# Patient Record
Sex: MALE | Race: WHITE | HISPANIC OR LATINO | Employment: FULL TIME | URBAN - METROPOLITAN AREA
[De-identification: names, ages, dates, MRNs, and addresses within clinical notes are randomized per-mention and may not be internally consistent; named-entity substitution may affect disease eponyms.]

---

## 2021-11-11 PROBLEM — Z00.00 ANNUAL PHYSICAL EXAM: Status: ACTIVE | Noted: 2021-11-11

## 2021-11-11 RX ORDER — LOSARTAN POTASSIUM AND HYDROCHLOROTHIAZIDE 12.5; 5 MG/1; MG/1
1 TABLET ORAL DAILY
COMMUNITY
Start: 2021-10-29 | End: 2022-04-12 | Stop reason: SDUPTHER

## 2021-11-11 RX ORDER — AZELASTINE 1 MG/ML
2 SPRAY, METERED NASAL
COMMUNITY
Start: 2021-09-08

## 2021-11-12 ENCOUNTER — OFFICE VISIT (OUTPATIENT)
Dept: FAMILY MEDICINE CLINIC | Facility: CLINIC | Age: 61
End: 2021-11-12
Payer: COMMERCIAL

## 2021-11-12 VITALS
OXYGEN SATURATION: 97 % | HEART RATE: 76 BPM | RESPIRATION RATE: 17 BRPM | TEMPERATURE: 98.6 F | HEIGHT: 69 IN | SYSTOLIC BLOOD PRESSURE: 168 MMHG | DIASTOLIC BLOOD PRESSURE: 90 MMHG | WEIGHT: 190 LBS | BODY MASS INDEX: 28.14 KG/M2

## 2021-11-12 DIAGNOSIS — R09.81 NASAL CONGESTION: Primary | ICD-10-CM

## 2021-11-12 DIAGNOSIS — Z23 NEED FOR VACCINATION: ICD-10-CM

## 2021-11-12 DIAGNOSIS — Z11.59 NEED FOR HEPATITIS C SCREENING TEST: Primary | ICD-10-CM

## 2021-11-12 DIAGNOSIS — Z12.11 COLON CANCER SCREENING: ICD-10-CM

## 2021-11-12 DIAGNOSIS — I10 PRIMARY HYPERTENSION: ICD-10-CM

## 2021-11-12 DIAGNOSIS — Z00.00 ANNUAL PHYSICAL EXAM: ICD-10-CM

## 2021-11-12 DIAGNOSIS — Z11.4 SCREENING FOR HIV (HUMAN IMMUNODEFICIENCY VIRUS): ICD-10-CM

## 2021-11-12 PROCEDURE — 90471 IMMUNIZATION ADMIN: CPT | Performed by: FAMILY MEDICINE

## 2021-11-12 PROCEDURE — 99386 PREV VISIT NEW AGE 40-64: CPT | Performed by: FAMILY MEDICINE

## 2021-11-12 PROCEDURE — 3725F SCREEN DEPRESSION PERFORMED: CPT | Performed by: FAMILY MEDICINE

## 2021-11-12 PROCEDURE — 1036F TOBACCO NON-USER: CPT | Performed by: FAMILY MEDICINE

## 2021-11-12 PROCEDURE — 90682 RIV4 VACC RECOMBINANT DNA IM: CPT | Performed by: FAMILY MEDICINE

## 2021-11-12 PROCEDURE — 3008F BODY MASS INDEX DOCD: CPT | Performed by: FAMILY MEDICINE

## 2021-12-15 ENCOUNTER — OFFICE VISIT (OUTPATIENT)
Dept: FAMILY MEDICINE CLINIC | Facility: CLINIC | Age: 61
End: 2021-12-15
Payer: COMMERCIAL

## 2021-12-15 VITALS
HEART RATE: 75 BPM | SYSTOLIC BLOOD PRESSURE: 124 MMHG | BODY MASS INDEX: 27.25 KG/M2 | OXYGEN SATURATION: 98 % | DIASTOLIC BLOOD PRESSURE: 72 MMHG | RESPIRATION RATE: 16 BRPM | TEMPERATURE: 97.1 F | WEIGHT: 184 LBS | HEIGHT: 69 IN

## 2021-12-15 DIAGNOSIS — I10 PRIMARY HYPERTENSION: Primary | ICD-10-CM

## 2021-12-15 DIAGNOSIS — R73.09 ABNORMAL GLUCOSE: ICD-10-CM

## 2021-12-15 DIAGNOSIS — R73.03 PREDIABETES: ICD-10-CM

## 2021-12-15 DIAGNOSIS — R97.20 ELEVATED PSA: ICD-10-CM

## 2021-12-15 LAB — SL AMB POCT HEMOGLOBIN AIC: 5.8 (ref ?–6.5)

## 2021-12-15 PROCEDURE — 99214 OFFICE O/P EST MOD 30 MIN: CPT | Performed by: FAMILY MEDICINE

## 2021-12-15 PROCEDURE — 3725F SCREEN DEPRESSION PERFORMED: CPT | Performed by: FAMILY MEDICINE

## 2021-12-15 PROCEDURE — 1036F TOBACCO NON-USER: CPT | Performed by: FAMILY MEDICINE

## 2021-12-15 PROCEDURE — 3008F BODY MASS INDEX DOCD: CPT | Performed by: FAMILY MEDICINE

## 2021-12-15 PROCEDURE — 83036 HEMOGLOBIN GLYCOSYLATED A1C: CPT | Performed by: FAMILY MEDICINE

## 2022-01-31 ENCOUNTER — TELEPHONE (OUTPATIENT)
Dept: SURGERY | Facility: CLINIC | Age: 62
End: 2022-01-31

## 2022-01-31 NOTE — TELEPHONE ENCOUNTER
LM to reschedule his 3/10 appt with Dr Karen Pollard since Dr Karen Pollard is on vacation  Please assist in rescheduling the patients new appointment with Dr Karen Pollard   Thanks

## 2022-03-03 ENCOUNTER — OFFICE VISIT (OUTPATIENT)
Dept: UROLOGY | Facility: CLINIC | Age: 62
End: 2022-03-03
Payer: COMMERCIAL

## 2022-03-03 VITALS
WEIGHT: 184 LBS | HEART RATE: 75 BPM | BODY MASS INDEX: 27.25 KG/M2 | DIASTOLIC BLOOD PRESSURE: 70 MMHG | OXYGEN SATURATION: 99 % | HEIGHT: 69 IN | RESPIRATION RATE: 16 BRPM | SYSTOLIC BLOOD PRESSURE: 130 MMHG

## 2022-03-03 DIAGNOSIS — R97.20 ELEVATED PSA: ICD-10-CM

## 2022-03-03 LAB — POST-VOID RESIDUAL VOLUME, ML POC: 0 ML

## 2022-03-03 PROCEDURE — 51798 US URINE CAPACITY MEASURE: CPT | Performed by: PHYSICIAN ASSISTANT

## 2022-03-03 PROCEDURE — 1036F TOBACCO NON-USER: CPT | Performed by: PHYSICIAN ASSISTANT

## 2022-03-03 PROCEDURE — 99203 OFFICE O/P NEW LOW 30 MIN: CPT | Performed by: PHYSICIAN ASSISTANT

## 2022-03-03 PROCEDURE — 3008F BODY MASS INDEX DOCD: CPT | Performed by: PHYSICIAN ASSISTANT

## 2022-03-03 RX ORDER — LORAZEPAM 1 MG/1
1 TABLET ORAL ONCE
Qty: 1 TABLET | Refills: 0 | Status: SHIPPED | OUTPATIENT
Start: 2022-03-03 | End: 2022-03-03

## 2022-03-03 NOTE — PROGRESS NOTES
1  Elevated PSA  Ambulatory referral to Urology    POCT Measure PVR    PSA, total and free    Basic metabolic panel    MRI prostate multiparametric wo w contrast    LORazepam (ATIVAN) 1 mg tablet     Assessment and plan:       1  Elevated PSA  -status post negative transrectal ultrasound-guided prostate biopsy 12/18/2019  Post biopsy complication of urosepsis requiring hospitalization  -I reviewed his PSA value was well as his rectal examination  -will plan to perform multiparametric prostate MRI in repeat PS A  Based on these findings we will further evaluate if biopsy needed  Gabrielle Salas PA-C      Chief Complaint     Elevated PSA     History of Present Illness     Hemalatha Cui is a 64 y o  male presenting as a new patient for elevated PSA  Patient previously followed with urology in [place] for elevated PSA  Patient was found to have an elevated PSA  He underwent a transrectal ultrasound-guided prostate biopsy 12/18/2019 which revealed active chronic inflammation without any findings of malignancy  Patient did have complications of post biopsy sepsis requiring hospitalization and IV antibiotics  Patient is very hesitant to have an another biopsy as a result of this  PSA 7 27 (11/25/21)     Patient is overall comfortable with his lower urinary tract symptoms  Denies any dysuria, gross hematuria, or difficulty voiding  Medical comorbidities include HTN, prediabetes  PVR 0mL    Review of Systems     Review of Systems   Constitutional: Negative for activity change, appetite change, chills, diaphoresis, fatigue, fever and unexpected weight change  Respiratory: Negative for chest tightness and shortness of breath  Cardiovascular: Negative for chest pain, palpitations and leg swelling  Gastrointestinal: Negative for abdominal distention, abdominal pain, constipation, diarrhea, nausea and vomiting     Genitourinary: Negative for decreased urine volume, difficulty urinating, dysuria, enuresis, flank pain, frequency, genital sores, hematuria and urgency  Musculoskeletal: Negative for back pain, gait problem and myalgias  Skin: Negative for color change, pallor, rash and wound  Psychiatric/Behavioral: Negative for behavioral problems  The patient is not nervous/anxious  Allergies     No Known Allergies    Physical Exam     Physical Exam  Constitutional:       General: He is not in acute distress  Appearance: Normal appearance  He is normal weight  He is not ill-appearing, toxic-appearing or diaphoretic  HENT:      Head: Normocephalic and atraumatic  Eyes:      General:         Right eye: No discharge  Left eye: No discharge  Conjunctiva/sclera: Conjunctivae normal    Pulmonary:      Effort: Pulmonary effort is normal  No respiratory distress  Genitourinary:     Comments: Good rectal tone  Prostate 60g, mild firmness at right prostate apex without any overt nodularity  Musculoskeletal:         General: No swelling or tenderness  Normal range of motion  Skin:     General: Skin is warm and dry  Coloration: Skin is not jaundiced or pale  Neurological:      General: No focal deficit present  Mental Status: He is alert and oriented to person, place, and time  Psychiatric:         Mood and Affect: Mood normal          Behavior: Behavior normal          Thought Content:  Thought content normal            Vital Signs     Vitals:    03/03/22 0752   BP: 130/70   Pulse: 75   Resp: 16   SpO2: 99%   Weight: 83 5 kg (184 lb)   Height: 5' 9" (1 753 m)         Current Medications       Current Outpatient Medications:     losartan-hydrochlorothiazide (HYZAAR) 50-12 5 mg per tablet, Take 1 tablet by mouth daily, Disp: , Rfl:     azelastine (ASTELIN) 0 1 % nasal spray, 2 sprays into each nostril (Patient not taking: Reported on 11/12/2021 ), Disp: , Rfl:     fluticasone (FLONASE) 50 mcg/act nasal spray, 2 sprays into each nostril daily (Patient not taking: Reported on 3/3/2022 ), Disp: 16 g, Rfl: 6    LORazepam (ATIVAN) 1 mg tablet, Take 1 tablet (1 mg total) by mouth once for 1 dose Take 1 hour prior to biopsy, Disp: 1 tablet, Rfl: 0      Active Problems     Patient Active Problem List   Diagnosis    Annual physical exam    Primary hypertension    Elevated PSA    Abnormal glucose    Prediabetes         Past Medical History     Past Medical History:   Diagnosis Date    Hypertension     Nasal congestion          Surgical History     Past Surgical History:   Procedure Laterality Date    APPENDECTOMY           Family History     Family History   Problem Relation Age of Onset    Heart disease Mother     Heart disease Father     Hypertension Father     Diabetes Brother     No Known Problems Brother     No Known Problems Brother     No Known Problems Brother     No Known Problems Brother     No Known Problems Maternal Grandmother     No Known Problems Maternal Grandfather     No Known Problems Paternal Grandmother     No Known Problems Paternal Grandfather          Social History     Social History       Radiology

## 2022-04-07 ENCOUNTER — HOSPITAL ENCOUNTER (OUTPATIENT)
Dept: RADIOLOGY | Age: 62
Discharge: HOME | End: 2022-04-07
Payer: COMMERCIAL

## 2022-04-07 DIAGNOSIS — R97.20 ELEVATED PSA: ICD-10-CM

## 2022-04-07 PROCEDURE — 76377 3D RENDER W/INTRP POSTPROCES: CPT

## 2022-04-07 PROCEDURE — 72197 MRI PELVIS W/O & W/DYE: CPT

## 2022-04-07 PROCEDURE — A9585 GADOBUTROL INJECTION: HCPCS | Performed by: PHYSICIAN ASSISTANT

## 2022-04-07 PROCEDURE — G1004 CDSM NDSC: HCPCS

## 2022-04-07 RX ADMIN — GADOBUTROL 8 ML: 604.72 INJECTION INTRAVENOUS at 13:31

## 2022-04-12 DIAGNOSIS — I10 PRIMARY HYPERTENSION: Primary | ICD-10-CM

## 2022-04-12 RX ORDER — LOSARTAN POTASSIUM AND HYDROCHLOROTHIAZIDE 12.5; 5 MG/1; MG/1
1 TABLET ORAL DAILY
Qty: 30 TABLET | Refills: 0 | Status: SHIPPED | OUTPATIENT
Start: 2022-04-12 | End: 2022-05-05 | Stop reason: SDUPTHER

## 2022-04-27 ENCOUNTER — OFFICE VISIT (OUTPATIENT)
Dept: UROLOGY | Facility: CLINIC | Age: 62
End: 2022-04-27
Payer: COMMERCIAL

## 2022-04-27 VITALS
HEIGHT: 69 IN | BODY MASS INDEX: 27.02 KG/M2 | HEART RATE: 72 BPM | SYSTOLIC BLOOD PRESSURE: 142 MMHG | DIASTOLIC BLOOD PRESSURE: 88 MMHG | WEIGHT: 182.4 LBS

## 2022-04-27 DIAGNOSIS — R97.20 ELEVATED PSA: Primary | ICD-10-CM

## 2022-04-27 PROCEDURE — 99213 OFFICE O/P EST LOW 20 MIN: CPT | Performed by: PHYSICIAN ASSISTANT

## 2022-04-27 NOTE — PROGRESS NOTES
1  Elevated PSA          Assessment and plan:     1  Elevated PSA  -status post negative transrectal ultrasound-guided prostate biopsy 12/18/2019  Post biopsy complication of urosepsis requiring hospitalization  -mpMRI 4/7/22 PIRADS 4  -recommend transperineal fusion guided biopsy  Risks reviewed  Will take prebiopsy antibiotic as well as antibiotic prophylaxis during procedure  Riley Hebert PA-C      Chief Complaint     Elevated PSA     History of Present Illness     Dax Rowan is a 64 y o  male presenting for follow up of elevated PSA  Patient previously followed with urology in [place] for elevated PSA  Patient was found to have an elevated PSA  He underwent a transrectal ultrasound-guided prostate biopsy 12/18/2019 which revealed active chronic inflammation without any findings of malignancy  Patient did have complications of post biopsy sepsis requiring hospitalization and IV antibiotics  Patient is very hesitant to have an another biopsy as a result of this  PSA trend:  7 27 (11/25/21)   6 01 with 12% free PSA (4/5/22)    mpMRI (4/7/22) showing PIRADS 4 lesion of the right posterior peripheral zone  Prostate glad 62g  Hypointense lesion of the right iliac bone incompletely evaluated  Patient is overall comfortable with his lower urinary tract symptoms  Denies any dysuria, gross hematuria, or difficulty voiding  PVR at last visit revealed complete bladder emptying    Medical comorbidities include HTN, prediabetes  Review of Systems     Review of Systems   Constitutional: Negative for activity change, appetite change, chills, diaphoresis, fatigue, fever and unexpected weight change  Respiratory: Negative for chest tightness and shortness of breath  Cardiovascular: Negative for chest pain, palpitations and leg swelling  Gastrointestinal: Negative for abdominal distention, abdominal pain, constipation, diarrhea, nausea and vomiting     Genitourinary: Negative for decreased urine volume, difficulty urinating, dysuria, enuresis, flank pain, frequency, genital sores, hematuria and urgency  Musculoskeletal: Negative for back pain, gait problem and myalgias  Skin: Negative for color change, pallor, rash and wound  Psychiatric/Behavioral: Negative for behavioral problems  The patient is not nervous/anxious  Allergies     No Known Allergies    Physical Exam     Physical Exam  Constitutional:       General: He is not in acute distress  Appearance: Normal appearance  He is normal weight  He is not ill-appearing, toxic-appearing or diaphoretic  HENT:      Head: Normocephalic and atraumatic  Eyes:      General:         Right eye: No discharge  Left eye: No discharge  Conjunctiva/sclera: Conjunctivae normal    Pulmonary:      Effort: Pulmonary effort is normal  No respiratory distress  Genitourinary:     Comments: Good rectal tone  Prostate 60g, mild firmness at right prostate apex without any overt nodularity  Musculoskeletal:         General: No swelling or tenderness  Normal range of motion  Skin:     General: Skin is warm and dry  Coloration: Skin is not jaundiced or pale  Neurological:      General: No focal deficit present  Mental Status: He is alert and oriented to person, place, and time  Psychiatric:         Mood and Affect: Mood normal          Behavior: Behavior normal          Thought Content:  Thought content normal            Vital Signs     Vitals:    04/27/22 0909   BP: 142/88   BP Location: Left arm   Patient Position: Sitting   Cuff Size: Adult   Pulse: 72   Weight: 82 7 kg (182 lb 6 4 oz)   Height: 5' 9" (1 753 m)         Current Medications       Current Outpatient Medications:     losartan-hydrochlorothiazide (HYZAAR) 50-12 5 mg per tablet, Take 1 tablet by mouth daily, Disp: 30 tablet, Rfl: 0    azelastine (ASTELIN) 0 1 % nasal spray, 2 sprays into each nostril (Patient not taking: Reported on 11/12/2021 ), Disp: , Rfl:     fluticasone (FLONASE) 50 mcg/act nasal spray, 2 sprays into each nostril daily (Patient not taking: Reported on 3/3/2022 ), Disp: 16 g, Rfl: 6    LORazepam (ATIVAN) 1 mg tablet, Take 1 tablet (1 mg total) by mouth once for 1 dose Take 1 hour prior to biopsy (Patient not taking: Reported on 4/27/2022 ), Disp: 1 tablet, Rfl: 0      Active Problems     Patient Active Problem List   Diagnosis    Encounter for screening colonoscopy    Primary hypertension    Elevated PSA    Abnormal glucose    Prediabetes         Past Medical History     Past Medical History:   Diagnosis Date    Hypertension     Nasal congestion          Surgical History     Past Surgical History:   Procedure Laterality Date    APPENDECTOMY           Family History     Family History   Problem Relation Age of Onset    Heart disease Mother     Heart disease Father     Hypertension Father     Diabetes Brother     No Known Problems Brother     No Known Problems Brother     No Known Problems Brother     No Known Problems Brother     No Known Problems Maternal Grandmother     No Known Problems Maternal Grandfather     No Known Problems Paternal Grandmother     No Known Problems Paternal Grandfather          Social History     Social History       Radiology

## 2022-04-28 ENCOUNTER — CONSULT (OUTPATIENT)
Dept: SURGERY | Facility: CLINIC | Age: 62
End: 2022-04-28
Payer: COMMERCIAL

## 2022-04-28 VITALS
DIASTOLIC BLOOD PRESSURE: 88 MMHG | OXYGEN SATURATION: 98 % | HEART RATE: 75 BPM | RESPIRATION RATE: 18 BRPM | WEIGHT: 183 LBS | HEIGHT: 69 IN | BODY MASS INDEX: 27.11 KG/M2 | SYSTOLIC BLOOD PRESSURE: 130 MMHG | TEMPERATURE: 97.2 F

## 2022-04-28 DIAGNOSIS — Z12.11 ENCOUNTER FOR SCREENING COLONOSCOPY: Primary | ICD-10-CM

## 2022-04-28 PROCEDURE — 1036F TOBACCO NON-USER: CPT | Performed by: SURGERY

## 2022-04-28 PROCEDURE — 99204 OFFICE O/P NEW MOD 45 MIN: CPT | Performed by: SURGERY

## 2022-04-28 PROCEDURE — 3008F BODY MASS INDEX DOCD: CPT | Performed by: SURGERY

## 2022-04-28 NOTE — PROGRESS NOTES
Assessment/Plan:  60-year-old male with a need for screening colonoscopy  This is being scheduled    No problem-specific Assessment & Plan notes found for this encounter  Diagnoses and all orders for this visit:    Encounter for screening colonoscopy          Subjective:      Patient ID: Cedric Alston is a 64 y o  male  60-year-old male who had a colonoscopy about 10 years ago which was negative  He is sent in for screening colonoscopy now  He has no history of blood in the stool or change in his stool habits  No history of colon carcinoma in the family      The following portions of the patient's history were reviewed and updated as appropriate: allergies, current medications, past family history, past medical history, past social history, past surgical history and problem list     Review of Systems   Constitutional:        Covid shots x 3  ? Had covid   HENT: Negative  Eyes:        Cataracts starting   Respiratory:        Sinus  Never smoked   Cardiovascular:        Hypertension   Gastrointestinal:        Appy out  Colonoscopy 10 years ago, fine  No family hx colon ca   Endocrine:        Borderline DM II   Genitourinary:        Nocturia x 1  psa high   Musculoskeletal: Positive for arthralgias  Neurological: Negative  Hematological: Negative  Psychiatric/Behavioral:        Anxiety         Objective:      /88 (BP Location: Left arm, Patient Position: Sitting, Cuff Size: Adult)   Pulse 75   Temp (!) 97 2 °F (36 2 °C)   Resp 18   Ht 5' 9" (1 753 m)   Wt 83 kg (183 lb)   SpO2 98%   BMI 27 02 kg/m²          Physical Exam  Vitals reviewed  Constitutional:       Appearance: Normal appearance  He is normal weight  HENT:      Head: Normocephalic and atraumatic  Eyes:      General: No scleral icterus  Conjunctiva/sclera: Conjunctivae normal    Cardiovascular:      Rate and Rhythm: Normal rate and regular rhythm  Heart sounds: Normal heart sounds   No murmur heard       Pulmonary:      Effort: Pulmonary effort is normal  No respiratory distress  Breath sounds: Normal breath sounds  No stridor  No wheezing, rhonchi or rales  Abdominal:      Palpations: Abdomen is soft  There is no mass  Hernia: No hernia is present  Musculoskeletal:         General: Normal range of motion  Cervical back: Normal range of motion  Lymphadenopathy:      Cervical: No cervical adenopathy  Skin:     General: Skin is warm and dry  Coloration: Skin is not jaundiced  Neurological:      Mental Status: He is alert and oriented to person, place, and time  Psychiatric:         Mood and Affect: Mood normal          Behavior: Behavior normal          Thought Content:  Thought content normal          Judgment: Judgment normal

## 2022-05-05 ENCOUNTER — OFFICE VISIT (OUTPATIENT)
Dept: FAMILY MEDICINE CLINIC | Facility: CLINIC | Age: 62
End: 2022-05-05
Payer: COMMERCIAL

## 2022-05-05 VITALS
RESPIRATION RATE: 16 BRPM | HEART RATE: 80 BPM | TEMPERATURE: 97.9 F | DIASTOLIC BLOOD PRESSURE: 82 MMHG | WEIGHT: 182 LBS | SYSTOLIC BLOOD PRESSURE: 130 MMHG | OXYGEN SATURATION: 98 % | BODY MASS INDEX: 26.96 KG/M2 | HEIGHT: 69 IN

## 2022-05-05 DIAGNOSIS — I10 PRIMARY HYPERTENSION: ICD-10-CM

## 2022-05-05 DIAGNOSIS — R73.03 PREDIABETES: ICD-10-CM

## 2022-05-05 DIAGNOSIS — R97.20 ELEVATED PSA: Primary | ICD-10-CM

## 2022-05-05 PROCEDURE — 99213 OFFICE O/P EST LOW 20 MIN: CPT | Performed by: FAMILY MEDICINE

## 2022-05-05 RX ORDER — LOSARTAN POTASSIUM AND HYDROCHLOROTHIAZIDE 12.5; 5 MG/1; MG/1
1 TABLET ORAL DAILY
Qty: 90 TABLET | Refills: 3 | Status: SHIPPED | OUTPATIENT
Start: 2022-05-05

## 2022-05-05 NOTE — PROGRESS NOTES
Assessment/Plan:         Problem List Items Addressed This Visit        Cardiovascular and Mediastinum    Primary hypertension     Well controlled on current therapy continue with current medications and will reassess next visit           Relevant Medications    losartan-hydrochlorothiazide (HYZAAR) 50-12 5 mg per tablet       Other    Elevated PSA - Primary     Will have biopsy [facility]         Prediabetes     Pt is avoiding  Sweets and starches                 Subjective: pt here for interval visit HTN  Prediabetes feels well     Patient ID: Carine Staples is a 64 y o  male  HPI    The following portions of the patient's history were reviewed and updated as appropriate:   Past Medical History:  He has a past medical history of Hypertension and Nasal congestion  ,  _______________________________________________________________________  Medical Problems:  does not have any pertinent problems on file ,  _______________________________________________________________________  Past Surgical History:   has a past surgical history that includes Appendectomy  ,  _______________________________________________________________________  Family History:  family history includes Diabetes in his brother; Heart disease in his father and mother; Hypertension in his father; No Known Problems in his brother, brother, brother, brother, maternal grandfather, maternal grandmother, paternal grandfather, and paternal grandmother ,  _______________________________________________________________________  Social History:   reports that he has never smoked  He has never used smokeless tobacco  He reports current alcohol use of about 2 0 standard drinks of alcohol per week  He reports that he does not use drugs  ,  _______________________________________________________________________  Allergies:  has No Known Allergies     _______________________________________________________________________  Current Outpatient Medications   Medication Sig Dispense Refill    losartan-hydrochlorothiazide (HYZAAR) 50-12 5 mg per tablet Take 1 tablet by mouth daily 90 tablet 3    azelastine (ASTELIN) 0 1 % nasal spray 2 sprays into each nostril (Patient not taking: Reported on 11/12/2021 )      fluticasone (FLONASE) 50 mcg/act nasal spray 2 sprays into each nostril daily (Patient not taking: Reported on 3/3/2022 ) 16 g 6    LORazepam (ATIVAN) 1 mg tablet Take 1 tablet (1 mg total) by mouth once for 1 dose Take 1 hour prior to biopsy (Patient not taking: Reported on 4/27/2022 ) 1 tablet 0     No current facility-administered medications for this visit      _______________________________________________________________________  Review of Systems   Respiratory: Negative for cough, chest tightness and shortness of breath  Cardiovascular: Negative for chest pain, palpitations and leg swelling  Neurological: Negative for dizziness, weakness, light-headedness and headaches  Psychiatric/Behavioral: Negative for dysphoric mood  The patient is not nervous/anxious  Objective:  Vitals:    05/05/22 1452   BP: 130/82   BP Location: Left arm   Patient Position: Sitting   Cuff Size: Standard   Pulse: 80   Resp: 16   Temp: 97 9 °F (36 6 °C)   TempSrc: Temporal   SpO2: 98%   Weight: 82 6 kg (182 lb)   Height: 5' 9" (1 753 m)     Body mass index is 26 88 kg/m²  Physical Exam  Constitutional:       General: He is not in acute distress  Appearance: Normal appearance  He is well-developed  He is not ill-appearing  Eyes:      Extraocular Movements: Extraocular movements intact  Pupils: Pupils are equal, round, and reactive to light  Cardiovascular:      Rate and Rhythm: Normal rate and regular rhythm  Pulses: Normal pulses  Heart sounds: Normal heart sounds  No murmur heard  Pulmonary:      Effort: Pulmonary effort is normal       Breath sounds: Normal breath sounds  Musculoskeletal:      Cervical back: Normal range of motion  Neurological:      General: No focal deficit present  Mental Status: He is alert and oriented to person, place, and time  Mental status is at baseline     Psychiatric:         Mood and Affect: Mood normal

## 2022-05-09 ENCOUNTER — APPOINTMENT (EMERGENCY)
Dept: CT IMAGING | Facility: HOSPITAL | Age: 62
End: 2022-05-09
Payer: COMMERCIAL

## 2022-05-09 ENCOUNTER — HOSPITAL ENCOUNTER (EMERGENCY)
Facility: HOSPITAL | Age: 62
Discharge: HOME | End: 2022-05-09
Attending: EMERGENCY MEDICINE
Payer: COMMERCIAL

## 2022-05-09 VITALS
SYSTOLIC BLOOD PRESSURE: 142 MMHG | HEART RATE: 86 BPM | OXYGEN SATURATION: 99 % | TEMPERATURE: 102.8 F | DIASTOLIC BLOOD PRESSURE: 74 MMHG | RESPIRATION RATE: 18 BRPM

## 2022-05-09 DIAGNOSIS — R50.9 ACUTE FEBRILE ILLNESS: Primary | ICD-10-CM

## 2022-05-09 LAB
BACTERIA UR QL AUTO: ABNORMAL /HPF
BILIRUB UR QL STRIP: NEGATIVE
CLARITY UR: CLEAR
COLOR UR: YELLOW
FLUAV RNA RESP QL NAA+PROBE: NEGATIVE
FLUBV RNA RESP QL NAA+PROBE: NEGATIVE
GLUCOSE UR STRIP-MCNC: NEGATIVE MG/DL
HGB UR QL STRIP.AUTO: ABNORMAL
KETONES UR STRIP-MCNC: NEGATIVE MG/DL
LEUKOCYTE ESTERASE UR QL STRIP: NEGATIVE
MUCOUS THREADS UR QL AUTO: ABNORMAL
NITRITE UR QL STRIP: NEGATIVE
NON-SQ EPI CELLS URNS QL MICRO: ABNORMAL /HPF
OTHER STN SPEC: ABNORMAL
PH UR STRIP.AUTO: 6 [PH]
PROT UR STRIP-MCNC: ABNORMAL MG/DL
RBC #/AREA URNS AUTO: ABNORMAL /HPF
RSV RNA RESP QL NAA+PROBE: NEGATIVE
SARS-COV-2 RNA RESP QL NAA+PROBE: NEGATIVE
SP GR UR STRIP.AUTO: >=1.03 (ref 1–1.03)
UROBILINOGEN UR QL STRIP.AUTO: 0.2 E.U./DL
WBC #/AREA URNS AUTO: ABNORMAL /HPF

## 2022-05-09 PROCEDURE — G1004 CDSM NDSC: HCPCS

## 2022-05-09 PROCEDURE — 99284 EMERGENCY DEPT VISIT MOD MDM: CPT

## 2022-05-09 PROCEDURE — 81001 URINALYSIS AUTO W/SCOPE: CPT | Performed by: EMERGENCY MEDICINE

## 2022-05-09 PROCEDURE — 74176 CT ABD & PELVIS W/O CONTRAST: CPT

## 2022-05-09 PROCEDURE — 0241U HB NFCT DS VIR RESP RNA 4 TRGT: CPT | Performed by: EMERGENCY MEDICINE

## 2022-05-09 PROCEDURE — 99282 EMERGENCY DEPT VISIT SF MDM: CPT | Performed by: EMERGENCY MEDICINE

## 2022-05-09 RX ORDER — IBUPROFEN 400 MG/1
400 TABLET ORAL ONCE
Status: COMPLETED | OUTPATIENT
Start: 2022-05-09 | End: 2022-05-09

## 2022-05-09 RX ADMIN — IBUPROFEN 400 MG: 400 TABLET, FILM COATED ORAL at 13:23

## 2022-05-09 NOTE — Clinical Note
Deborah Shah was seen and treated in our emergency department on 5/9/2022  No restrictions            Diagnosis:     Joselyn  may return to work on return date  He may return on this date: 05/11/2022         If you have any questions or concerns, please don't hesitate to call        Hernandez Moraes MD    ______________________________           _______________          _______________  Hospital Representative                              Date                                Time

## 2022-05-09 NOTE — ED PROVIDER NOTES
History  Chief Complaint   Patient presents with    Fever - 9 weeks to 74 years     Pt reports fever, body aches, chills and burning with urination  Home covid test negative       History provided by:  Patient   used: No    Fever - 9 weeks to 74 years  Associated symptoms: chills, dysuria and myalgias    Associated symptoms: no chest pain, no congestion, no cough, no ear pain, no headaches, no nausea, no rash and no vomiting     75-year-old male with history of hypertension presents for evaluation of fever for 2 days with some burning with urination  Denies any abdominal pain, back or flank pain, hematuria, history of UTI  The first day he also noticed some body aches, chills, a little bit of a sore throat, and some left leg pain  He feels fatigued overall  Body aches, sore throat, leg pain have all improved  Denies any rashes, nausea, vomiting  He has been eating, drinking, sleeping normal   No sick contacts  He did take a home COVID test which was negative  No cough, dyspnea, chest pain  Febrile on arrival   Given Motrin in triage  Vitals otherwise normal heart sounds normal   Breath sounds clear  Abdomen soft and nontender  No CVA tenderness  Will check UA, re-evaluate  Prior to Admission Medications   Prescriptions Last Dose Informant Patient Reported? Taking?    LORazepam (ATIVAN) 1 mg tablet   No No   Sig: Take 1 tablet (1 mg total) by mouth once for 1 dose Take 1 hour prior to biopsy   Patient not taking: No sig reported   azelastine (ASTELIN) 0 1 % nasal spray   Yes No   Si sprays into each nostril   Patient not taking: No sig reported   fluticasone (FLONASE) 50 mcg/act nasal spray   No No   Si sprays into each nostril daily   Patient not taking: No sig reported   losartan-hydrochlorothiazide (HYZAAR) 50-12 5 mg per tablet   No No   Sig: Take 1 tablet by mouth daily      Facility-Administered Medications: None       Past Medical History:   Diagnosis Date    Hypertension     Nasal congestion        Past Surgical History:   Procedure Laterality Date    APPENDECTOMY         Family History   Problem Relation Age of Onset    Heart disease Mother     Heart disease Father     Hypertension Father     Diabetes Brother     No Known Problems Brother     No Known Problems Brother     No Known Problems Brother     No Known Problems Brother     No Known Problems Maternal Grandmother     No Known Problems Maternal Grandfather     No Known Problems Paternal Grandmother     No Known Problems Paternal Grandfather      I have reviewed and agree with the history as documented  E-Cigarette/Vaping    E-Cigarette Use Never User      E-Cigarette/Vaping Substances    Nicotine No     THC No     CBD No     Flavoring No     Other No     Unknown No      Social History     Tobacco Use    Smoking status: Never Smoker    Smokeless tobacco: Never Used   Vaping Use    Vaping Use: Never used   Substance Use Topics    Alcohol use: Yes     Alcohol/week: 2 0 standard drinks     Types: 2 Glasses of wine per week     Comment: occ    Drug use: Never       Review of Systems   Constitutional: Positive for chills, fatigue and fever  Negative for activity change and appetite change  HENT: Negative for congestion, ear pain, facial swelling, sinus pressure, trouble swallowing and voice change  Respiratory: Negative for cough, chest tightness and shortness of breath  Cardiovascular: Negative for chest pain  Gastrointestinal: Negative for abdominal pain, nausea and vomiting  Genitourinary: Positive for dysuria  Negative for difficulty urinating  Musculoskeletal: Positive for myalgias  Negative for back pain  Skin: Negative for color change and rash  Neurological: Negative for dizziness, weakness, light-headedness and headaches  All other systems reviewed and are negative  Physical Exam  Physical Exam  Vitals and nursing note reviewed     Constitutional: Appearance: Normal appearance  HENT:      Head: Normocephalic and atraumatic  Cardiovascular:      Rate and Rhythm: Normal rate and regular rhythm  Heart sounds: Normal heart sounds  No murmur heard  Pulmonary:      Effort: Pulmonary effort is normal       Breath sounds: Normal breath sounds  Abdominal:      General: There is no distension  Palpations: Abdomen is soft  Tenderness: There is no abdominal tenderness  There is no right CVA tenderness or left CVA tenderness  Musculoskeletal:         General: No swelling or tenderness  Normal range of motion  Cervical back: Normal range of motion and neck supple  Lymphadenopathy:      Cervical: No cervical adenopathy  Skin:     General: Skin is warm and dry  Capillary Refill: Capillary refill takes less than 2 seconds  Findings: No rash  Neurological:      General: No focal deficit present  Mental Status: He is alert and oriented to person, place, and time     Psychiatric:         Mood and Affect: Mood normal          Behavior: Behavior normal          Vital Signs  ED Triage Vitals [05/09/22 1318]   Temperature Pulse Respirations Blood Pressure SpO2   (!) 102 8 °F (39 3 °C) 86 18 142/74 99 %      Temp Source Heart Rate Source Patient Position - Orthostatic VS BP Location FiO2 (%)   Oral Monitor Sitting Right arm --      Pain Score       No Pain           Vitals:    05/09/22 1318   BP: 142/74   Pulse: 86   Patient Position - Orthostatic VS: Sitting         Visual Acuity      ED Medications  Medications   ibuprofen (MOTRIN) tablet 400 mg (400 mg Oral Given 5/9/22 1323)       Diagnostic Studies  Results Reviewed     Procedure Component Value Units Date/Time    COVID/FLU/RSV - 2 hour TAT [[id number]]  (Normal) Collected: 05/09/22 1703    Lab Status: Final result Specimen: Nares from Nose Updated: 05/09/22 9884     SARS-CoV-2 Negative     INFLUENZA A PCR Negative     INFLUENZA B PCR Negative     RSV PCR Negative    Narrative: FOR PEDIATRIC PATIENTS - copy/paste COVID Guidelines URL to browser: [email or web address]    SARS-CoV-2 assay is a Nucleic Acid Amplification assay intended for the  qualitative detection of nucleic acid from SARS-CoV-2 in nasopharyngeal  swabs  Results are for the presumptive identification of SARS-CoV-2 RNA  Positive results are indicative of infection with SARS-CoV-2, the virus  causing COVID-19, but do not rule out bacterial infection or co-infection  with other viruses  Laboratories within [facility] and [facility] are required to report all positive results to the appropriate  public health authorities  Negative results do not preclude SARS-CoV-2  infection and should not be used as the sole basis for treatment or other  patient management decisions  Negative results must be combined with  clinical observations, patient history, and epidemiological information  This test has not been FDA cleared or approved  This test has been authorized by FDA under an Emergency Use Authorization  (EUA)  This test is only authorized for the duration of time the  declaration that circumstances exist justifying the authorization of the  emergency use of an in vitro diagnostic tests for detection of SARS-CoV-2  virus and/or diagnosis of COVID-19 infection under section 564(b)(1) of  the Act, 21 U  S C  080HKC-4(N)(4), unless the authorization is terminated  or revoked sooner  The test has been validated but independent review by FDA  and CLIA is pending  Test performed using [facility] GeneXpert: This RT-PCR assay targets N2,  a region unique to SARS-CoV-2  A conserved region in the E-gene was chosen  for pan-Sarbecovirus detection which includes SARS-CoV-2      Urine Microscopic [[id number]]  (Abnormal) Collected: 05/09/22 1455    Lab Status: Final result Specimen: Urine, Clean Catch Updated: 05/09/22 1516     RBC, UA 1-2 /hpf      WBC, UA 0-1 /hpf Epithelial Cells Occasional /hpf      Bacteria, UA Occasional /hpf      OTHER OBSERVATIONS Sperm Present     MUCUS THREADS Occasional    UA w Reflex to Microscopic w Reflex to Culture [[id number]]  (Abnormal) Collected: 05/09/22 1455    Lab Status: Final result Specimen: Urine, Clean Catch Updated: 05/09/22 1508     Color, UA Yellow     Clarity, UA Clear     Specific Gravity, UA >=1 030     pH, UA 6 0     Leukocytes, UA Negative     Nitrite, UA Negative     Protein, UA Trace mg/dl      Glucose, UA Negative mg/dl      Ketones, UA Negative mg/dl      Urobilinogen, UA 0 2 E U /dl      Bilirubin, UA Negative     Blood, UA Small                 CT renal stone study abdomen pelvis without contrast   Final Result by Hilda Park MD (05/09 1622)         1  Mild nonspecific bilateral perinephric edema  No calculi in the kidneys ureters or bladder  No evidence of abscess  2   Mild prostatomegaly  3   Large amount of stool throughout the colon  No evidence of bowel obstruction  The study was marked in [facility] for immediate notification  Workstation performed: [id number]                    Procedures  Procedures         ED Course  ED Course as of 05/29/22 1553   Mon May 09, 2022   1653 Discussed CT and urine results  No evidence of UTI, ureteral stone, prostatitis  Patient feels okay at this time  Symptoms likely related to viral illness  Ready for discharge home  Pending COVID/flu/RSV swab  Will give note for 2 days  Discussed return to the ED if he develops new or concerning symptoms  Advised increasing fluid intake and managing fever with antipyretics  MDM  Number of Diagnoses or Management Options  Acute febrile illness: new and requires workup  Diagnosis management comments: 80-year-old male presented for evaluation of 2 days of fever, body aches, some discomfort urinating  Also had a sore throat, fatigue    Febrile on arrival   No focal source of infection identified  COVID/flu/RSV pending  Likely viral illness  UA and other labs unremarkable  Stable for discharge home  Continue symptomatic management  Return if worse  Amount and/or Complexity of Data Reviewed  Clinical lab tests: ordered and reviewed  Tests in the radiology section of CPT®: ordered and reviewed    Patient Progress  Patient progress: stable      Disposition  Final diagnoses:   Acute febrile illness     Time reflects when diagnosis was documented in both MDM as applicable and the Disposition within this note     Time User Action Codes Description Comment    5/9/2022  4:54 PM Shayla Morrison Add [R50 9] Acute febrile illness       ED Disposition     ED Disposition   Discharge    Condition   Stable    Date/Time   Mon May 9, 2022  4:54 PM    Comment   Itz Ramirez 61 discharge to home/self care                 Follow-up Information     Follow up With Specialties Details Why Contact Info Additional [address] [address] [place], MD Family Medicine   [address] [facility] [address] [facility] Emergency Department Emergency Medicine  If symptoms worsen [address] [facility] [address] [facility] Emergency Department, [address], [place], [address] [facility], [address]          Discharge Medication List as of 5/9/2022  4:58 PM      CONTINUE these medications which have NOT CHANGED    Details   azelastine (ASTELIN) 0 1 % nasal spray 2 sprays into each nostril, Starting Wed 9/8/2021, Historical Med      fluticasone (FLONASE) 50 mcg/act nasal spray 2 sprays into each nostril daily, Starting Thu 12/2/2021, Normal      LORazepam (ATIVAN) 1 mg tablet Take 1 tablet (1 mg total) by mouth once for 1 dose Take 1 hour prior to biopsy, Starting Thu 3/3/2022, Normal      losartan-hydrochlorothiazide (HYZAAR) 50-12 5 mg per tablet Take 1 tablet by mouth daily, Starting Thu 5/5/2022, Normal             No discharge procedures on file      PDMP Review     None          ED Provider  Electronically Signed by           Nilson Lr MD  05/29/22 7795

## 2022-05-12 ENCOUNTER — OFFICE VISIT (OUTPATIENT)
Dept: FAMILY MEDICINE CLINIC | Facility: CLINIC | Age: 62
End: 2022-05-12
Payer: COMMERCIAL

## 2022-05-12 VITALS
WEIGHT: 183 LBS | HEIGHT: 69 IN | HEART RATE: 81 BPM | OXYGEN SATURATION: 97 % | BODY MASS INDEX: 27.11 KG/M2 | RESPIRATION RATE: 16 BRPM | TEMPERATURE: 97.2 F | SYSTOLIC BLOOD PRESSURE: 100 MMHG | DIASTOLIC BLOOD PRESSURE: 60 MMHG

## 2022-05-12 DIAGNOSIS — R50.9 FEBRILE ILLNESS: ICD-10-CM

## 2022-05-12 DIAGNOSIS — I10 PRIMARY HYPERTENSION: ICD-10-CM

## 2022-05-12 DIAGNOSIS — R79.89 ABNORMAL CBC: ICD-10-CM

## 2022-05-12 DIAGNOSIS — R39.9 ABNORMAL RENAL FINDING: Primary | ICD-10-CM

## 2022-05-12 PROCEDURE — 3725F SCREEN DEPRESSION PERFORMED: CPT | Performed by: FAMILY MEDICINE

## 2022-05-12 PROCEDURE — 87636 SARSCOV2 & INF A&B AMP PRB: CPT | Performed by: FAMILY MEDICINE

## 2022-05-12 PROCEDURE — 99214 OFFICE O/P EST MOD 30 MIN: CPT | Performed by: FAMILY MEDICINE

## 2022-05-12 NOTE — ASSESSMENT & PLAN NOTE
Likely viral pt had covid flu test 2 days after onset of illness may have been too low will recheck   If worsens to Er followup here 4 days (Monday)

## 2022-05-12 NOTE — PROGRESS NOTES
Assessment/Plan:         Problem List Items Addressed This Visit        Cardiovascular and Mediastinum    Primary hypertension     Pt on meds pt to hold until feels better              Other    Abnormal renal finding - Primary     Bmp today increase fluids           Relevant Orders    Basic metabolic panel    Abnormal CBC     Recheck             Relevant Orders    CBC and differential    Febrile illness     Likely viral pt had covid flu test 2 days after onset of illness may have been too low will recheck  If worsens to Er followup here 4 days (Monday)           Relevant Orders    Covid/Flu- Office Collect            Subjective: pt ill  For 5 days had fever 102 4 then went to 103 the next day went to ER 2  Chest xray  Done neg had elevated wbc  Pt still taking bp meds  No dysuria now no perineal pain      Patient ID: Jada Mcintosh is a 64 y o  male  HPI    The following portions of the patient's history were reviewed and updated as appropriate:   Past Medical History:  He has a past medical history of Hypertension and Nasal congestion  ,  _______________________________________________________________________  Medical Problems:  does not have any pertinent problems on file ,  _______________________________________________________________________  Past Surgical History:   has a past surgical history that includes Appendectomy  ,  _______________________________________________________________________  Family History:  family history includes Diabetes in his brother; Heart disease in his father and mother; Hypertension in his father; No Known Problems in his brother, brother, brother, brother, maternal grandfather, maternal grandmother, paternal grandfather, and paternal grandmother ,  _______________________________________________________________________  Social History:   reports that he has never smoked   He has never used smokeless tobacco  He reports current alcohol use of about 2 0 standard drinks of alcohol per week  He reports that he does not use drugs  ,  _______________________________________________________________________  Allergies:  has No Known Allergies     _______________________________________________________________________  Current Outpatient Medications   Medication Sig Dispense Refill    losartan-hydrochlorothiazide (HYZAAR) 50-12 5 mg per tablet Take 1 tablet by mouth daily 90 tablet 3    azelastine (ASTELIN) 0 1 % nasal spray 2 sprays into each nostril (Patient not taking: No sig reported)      fluticasone (FLONASE) 50 mcg/act nasal spray 2 sprays into each nostril daily (Patient not taking: No sig reported) 16 g 6    LORazepam (ATIVAN) 1 mg tablet Take 1 tablet (1 mg total) by mouth once for 1 dose Take 1 hour prior to biopsy (Patient not taking: No sig reported) 1 tablet 0     No current facility-administered medications for this visit      _______________________________________________________________________  Review of Systems   Constitutional: Positive for fever  Negative for appetite change, chills and fatigue  Respiratory: Negative for cough, chest tightness and shortness of breath  Cardiovascular: Negative for chest pain, palpitations and leg swelling  Gastrointestinal: Negative for abdominal pain, constipation, diarrhea, nausea and vomiting  Genitourinary: Negative for difficulty urinating and frequency  Musculoskeletal: Negative for arthralgias, back pain and neck pain  Skin: Negative for rash  Neurological: Negative for dizziness, weakness, light-headedness, numbness and headaches  Hematological: Does not bruise/bleed easily  Psychiatric/Behavioral: Negative for dysphoric mood and sleep disturbance  The patient is not nervous/anxious            Objective:  Vitals:    05/12/22 1322   BP: 100/60   BP Location: Left arm   Patient Position: Sitting   Cuff Size: Standard   Pulse: 81   Resp: 16   Temp: (!) 97 2 °F (36 2 °C)   TempSrc: Temporal   SpO2: 97%   Weight: 83 kg (183 lb)   Height: 5' 9" (1 753 m)     Body mass index is 27 02 kg/m²  Physical Exam  Vitals reviewed  Constitutional:       General: He is not in acute distress  Appearance: Normal appearance  He is well-developed  He is not ill-appearing  HENT:      Mouth/Throat:      Mouth: Mucous membranes are moist    Eyes:      Extraocular Movements: Extraocular movements intact  Conjunctiva/sclera: Conjunctivae normal       Pupils: Pupils are equal, round, and reactive to light  Neck:      Thyroid: No thyromegaly  Vascular: No carotid bruit  Cardiovascular:      Rate and Rhythm: Normal rate and regular rhythm  Pulses: Normal pulses  Heart sounds: Normal heart sounds  No murmur heard  Pulmonary:      Effort: Pulmonary effort is normal       Breath sounds: Normal breath sounds  Chest:      Chest wall: No tenderness  Abdominal:      General: Bowel sounds are normal  There is no distension  Palpations: Abdomen is soft  Tenderness: There is no abdominal tenderness  Musculoskeletal:      Cervical back: Normal range of motion and neck supple  Lymphadenopathy:      Cervical: No cervical adenopathy  Skin:     General: Skin is warm and dry  Neurological:      General: No focal deficit present  Mental Status: He is alert and oriented to person, place, and time  Mental status is at baseline  Cranial Nerves: No cranial nerve deficit     Psychiatric:         Mood and Affect: Mood normal          Behavior: Behavior normal

## 2022-05-13 LAB
FLUAV RNA RESP QL NAA+PROBE: NEGATIVE
FLUBV RNA RESP QL NAA+PROBE: NEGATIVE
SARS-COV-2 RNA RESP QL NAA+PROBE: NEGATIVE

## 2022-05-16 ENCOUNTER — OFFICE VISIT (OUTPATIENT)
Dept: FAMILY MEDICINE CLINIC | Facility: CLINIC | Age: 62
End: 2022-05-16
Payer: COMMERCIAL

## 2022-05-16 VITALS
BODY MASS INDEX: 27.4 KG/M2 | SYSTOLIC BLOOD PRESSURE: 110 MMHG | HEIGHT: 69 IN | TEMPERATURE: 98.8 F | RESPIRATION RATE: 16 BRPM | OXYGEN SATURATION: 98 % | HEART RATE: 78 BPM | DIASTOLIC BLOOD PRESSURE: 60 MMHG | WEIGHT: 185 LBS

## 2022-05-16 DIAGNOSIS — I10 PRIMARY HYPERTENSION: Primary | ICD-10-CM

## 2022-05-16 DIAGNOSIS — N28.9 ABNORMAL RENAL FUNCTION: ICD-10-CM

## 2022-05-16 DIAGNOSIS — R79.89 ABNORMAL CBC: ICD-10-CM

## 2022-05-16 DIAGNOSIS — R50.9 FEBRILE ILLNESS: ICD-10-CM

## 2022-05-16 PROCEDURE — 99214 OFFICE O/P EST MOD 30 MIN: CPT | Performed by: FAMILY MEDICINE

## 2022-05-16 NOTE — ASSESSMENT & PLAN NOTE
Abnormal creatinine asymptomatic no urinary complaints no abd or flank pain will need US kidneys send for kidney consult

## 2022-05-16 NOTE — ASSESSMENT & PLAN NOTE
Still elevated WBC without focus urine culture blood culture were negative  Discharged from to Er visits with likely viral illness clinically improving will echeckj labs again and followup 3 days

## 2022-05-16 NOTE — PROGRESS NOTES
Assessment/Plan:         Problem List Items Addressed This Visit        Cardiovascular and Mediastinum    Primary hypertension - Primary     Ok today without meds will recheck office visit  3 days              Other    Abnormal renal function     Abnormal creatinine asymptomatic no urinary complaints no abd or flank pain will need US kidneys send for kidney consult           Relevant Orders    US kidney and bladder with pvr    Ambulatory Referral to Nephrology    Basic metabolic panel    Abnormal CBC     Still elevated WBC without focus urine culture blood culture were negative  Discharged from to Er visits with likely viral illness clinically improving will echeckj labs again and followup 3 days            Relevant Orders    CBC and differential    Febrile illness     No fever today pt states had 101 last night recheck labs follow up 3 days  To Er if worsens                    Subjective: pt  Here for repeat evaluation feels he is getting better not using tylenol had tempt 101 last pm abn renal function and cbc off bp meds as advised urine culture blood culture from ER neg [facility] ER visit and [facility] ER visit     Patient ID: Sania Pollard is a 64 y o  male  HPI    The following portions of the patient's history were reviewed and updated as appropriate:   Past Medical History:  He has a past medical history of Hypertension and Nasal congestion  ,  _______________________________________________________________________  Medical Problems:  does not have any pertinent problems on file ,  _______________________________________________________________________  Past Surgical History:   has a past surgical history that includes Appendectomy  ,  _______________________________________________________________________  Family History:  family history includes Diabetes in his brother; Heart disease in his father and mother; Hypertension in his father; No Known Problems in his brother, brother, brother, brother, maternal grandfather, maternal grandmother, paternal grandfather, and paternal grandmother ,  _______________________________________________________________________  Social History:   reports that he has never smoked  He has never used smokeless tobacco  He reports current alcohol use of about 2 0 standard drinks of alcohol per week  He reports that he does not use drugs  ,  _______________________________________________________________________  Allergies:  has No Known Allergies     _______________________________________________________________________  Current Outpatient Medications   Medication Sig Dispense Refill    azelastine (ASTELIN) 0 1 % nasal spray 2 sprays into each nostril (Patient not taking: No sig reported)      fluticasone (FLONASE) 50 mcg/act nasal spray 2 sprays into each nostril daily (Patient not taking: No sig reported) 16 g 6    LORazepam (ATIVAN) 1 mg tablet Take 1 tablet (1 mg total) by mouth once for 1 dose Take 1 hour prior to biopsy (Patient not taking: No sig reported) 1 tablet 0    losartan-hydrochlorothiazide (HYZAAR) 50-12 5 mg per tablet Take 1 tablet by mouth daily 90 tablet 3     No current facility-administered medications for this visit      _______________________________________________________________________  Review of Systems   Constitutional: Positive for appetite change (decrease appetite taking liquids) and fatigue  HENT: Negative for congestion and sore throat  Respiratory: Negative for cough and shortness of breath  Cardiovascular: Negative for chest pain, palpitations and leg swelling  Gastrointestinal: Negative for abdominal pain, diarrhea and vomiting  Genitourinary: Negative for dysuria, frequency and hematuria  Neurological: Negative for headaches           Objective:  Vitals:    05/16/22 1054   BP: 110/60   Pulse: 78   Resp: 16   Temp: 98 8 °F (37 1 °C)   SpO2: 98%   Weight: 83 9 kg (185 lb)   Height: 5' 9" (1 753 m)     Body mass index is 27 32 kg/m²  Physical Exam  Vitals reviewed  Constitutional:       General: He is not in acute distress  Appearance: Normal appearance  He is well-developed  He is not ill-appearing  HENT:      Mouth/Throat:      Mouth: Mucous membranes are moist    Eyes:      Extraocular Movements: Extraocular movements intact  Conjunctiva/sclera: Conjunctivae normal       Pupils: Pupils are equal, round, and reactive to light  Neck:      Thyroid: No thyromegaly  Vascular: No carotid bruit  Cardiovascular:      Rate and Rhythm: Normal rate and regular rhythm  Pulses: Normal pulses  Heart sounds: Normal heart sounds  No murmur heard  Pulmonary:      Effort: Pulmonary effort is normal       Breath sounds: Normal breath sounds  Chest:      Chest wall: No tenderness  Abdominal:      General: Bowel sounds are normal  There is no distension  Palpations: Abdomen is soft  Tenderness: There is no abdominal tenderness  Musculoskeletal:      Cervical back: Normal range of motion and neck supple  Lymphadenopathy:      Cervical: No cervical adenopathy  Skin:     General: Skin is warm and dry  Neurological:      General: No focal deficit present  Mental Status: He is alert and oriented to person, place, and time  Mental status is at baseline  Cranial Nerves: No cranial nerve deficit     Psychiatric:         Mood and Affect: Mood normal          Behavior: Behavior normal

## 2022-05-17 ENCOUNTER — TELEPHONE (OUTPATIENT)
Dept: NEPHROLOGY | Facility: CLINIC | Age: 62
End: 2022-05-17

## 2022-05-17 NOTE — TELEPHONE ENCOUNTER
New Patient Intake Form   Patient Details   Christine Núñez     1960     [id number]     Appointment Information   Who is calling to schedule? If not patient, what is callers name? Darren Stephenson    Referring Provider Dr Princess Obrien   Referring Provider Number [id number]   Reason for Appt (Diagnosis) Abnormal renal function   Is patient aware of why they are being referred? yes   Does Patient have labs done at [place] [address]? If not, where do they go? no - [facility]   Has patient had labs / urine work done? List date of most recent lab / urine work yes    Has patient had a BMP & CBC done in the past 2 years? If so, list the date yes    Has patient been hospitalized recently? If yes, list name and location of hospital they were in no    Has patient been seen by a Nephrologist before? If yes, list name, location and phone number  no   Has patient been see by another Specialty before (ex  Neurology, urology, cardiology)? If yes, please list name, and specialty  urology [facility]   Has the patient had imaging done? If so, list the most recent date and type of imaging yes - CT   Does the patient has a stone analysis report if history of kidney stones? no   Appointment Details   Is there a referral on file?  yes    Appointment Date  7/21   Location [place]    Miscellaneous

## 2022-05-19 ENCOUNTER — HOSPITAL ENCOUNTER (OUTPATIENT)
Dept: ULTRASOUND IMAGING | Facility: HOSPITAL | Age: 62
Discharge: HOME | End: 2022-05-19
Attending: FAMILY MEDICINE
Payer: COMMERCIAL

## 2022-05-19 ENCOUNTER — OFFICE VISIT (OUTPATIENT)
Dept: FAMILY MEDICINE CLINIC | Facility: CLINIC | Age: 62
End: 2022-05-19
Payer: COMMERCIAL

## 2022-05-19 VITALS
HEIGHT: 69 IN | RESPIRATION RATE: 16 BRPM | TEMPERATURE: 97.3 F | DIASTOLIC BLOOD PRESSURE: 78 MMHG | HEART RATE: 65 BPM | BODY MASS INDEX: 26.81 KG/M2 | WEIGHT: 181 LBS | SYSTOLIC BLOOD PRESSURE: 128 MMHG | OXYGEN SATURATION: 99 %

## 2022-05-19 DIAGNOSIS — E83.51 HYPOCALCEMIA: ICD-10-CM

## 2022-05-19 DIAGNOSIS — I10 PRIMARY HYPERTENSION: Primary | ICD-10-CM

## 2022-05-19 DIAGNOSIS — N28.9 ABNORMAL RENAL FUNCTION: ICD-10-CM

## 2022-05-19 DIAGNOSIS — R50.9 FEBRILE ILLNESS: ICD-10-CM

## 2022-05-19 PROCEDURE — 76770 US EXAM ABDO BACK WALL COMP: CPT

## 2022-05-19 PROCEDURE — 3008F BODY MASS INDEX DOCD: CPT | Performed by: FAMILY MEDICINE

## 2022-05-19 PROCEDURE — 1036F TOBACCO NON-USER: CPT | Performed by: FAMILY MEDICINE

## 2022-05-19 PROCEDURE — 99214 OFFICE O/P EST MOD 30 MIN: CPT | Performed by: FAMILY MEDICINE

## 2022-05-19 NOTE — ASSESSMENT & PLAN NOTE
Pt now feels well  Will return to work 5/23 medically excused 5/16-5/20   Labs returning to nl  Has kidney and bladder US pending results  Reviewed labs now improving

## 2022-05-19 NOTE — ASSESSMENT & PLAN NOTE
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 Skin closed with stples

## 2022-05-19 NOTE — ASSESSMENT & PLAN NOTE
Currently nl on no meds pt to continue monitoring his BP at home if increases over 140/90 to restart meds

## 2022-05-19 NOTE — PROGRESS NOTES
u  Assessment/Plan:         Problem List Items Addressed This Visit        Cardiovascular and Mediastinum    Primary hypertension - Primary     Currently nl on no meds pt to continue monitoring his BP at home if increases over 140/90 to restart meds               Other    Abnormal renal function     Recheck bmp 1 mo           Relevant Orders    Basic metabolic panel    Febrile illness     Pt now feels well  Will return to work 5/23 medically excused 5/16-5/20  Labs returning to nl  Has kidney and bladder US pending results  Reviewed labs now improving           Hypocalcemia     Check ionized calcium           Relevant Orders    Calcium, ionized            Subjective: pt here for  Reevaluation of viral illness pt feels much better now feels strong eating well no fever had US kidney and bladder today   Results pending     Patient ID: Teddy Kaufman is a 64 y o  male  HPI    The following portions of the patient's history were reviewed and updated as appropriate:   Past Medical History:  He has a past medical history of Hypertension and Nasal congestion  ,  _______________________________________________________________________  Medical Problems:  does not have any pertinent problems on file ,  _______________________________________________________________________  Past Surgical History:   has a past surgical history that includes Appendectomy  ,  _______________________________________________________________________  Family History:  family history includes Diabetes in his brother; Heart disease in his father and mother; Hypertension in his father; No Known Problems in his brother, brother, brother, brother, maternal grandfather, maternal grandmother, paternal grandfather, and paternal grandmother ,  _______________________________________________________________________  Social History:   reports that he has never smoked   He has never used smokeless tobacco  He reports current alcohol use of about 2 0 standard drinks of alcohol per week  He reports that he does not use drugs  ,  _______________________________________________________________________  Allergies:  has No Known Allergies     _______________________________________________________________________  Current Outpatient Medications   Medication Sig Dispense Refill    losartan-hydrochlorothiazide (HYZAAR) 50-12 5 mg per tablet Take 1 tablet by mouth daily 90 tablet 3    azelastine (ASTELIN) 0 1 % nasal spray 2 sprays into each nostril (Patient not taking: No sig reported)      fluticasone (FLONASE) 50 mcg/act nasal spray 2 sprays into each nostril daily (Patient not taking: No sig reported) 16 g 6    LORazepam (ATIVAN) 1 mg tablet Take 1 tablet (1 mg total) by mouth once for 1 dose Take 1 hour prior to biopsy (Patient not taking: No sig reported) 1 tablet 0     No current facility-administered medications for this visit      _______________________________________________________________________  Review of Systems   Constitutional: Negative for chills and fever  HENT: Negative for congestion  Respiratory: Negative for cough, chest tightness and shortness of breath  Cardiovascular: Negative for chest pain, palpitations and leg swelling  Gastrointestinal: Negative for abdominal pain  Genitourinary: Negative for difficulty urinating and dysuria  Neurological: Negative for dizziness, weakness, light-headedness and headaches  Psychiatric/Behavioral: Negative for dysphoric mood  The patient is not nervous/anxious  Objective:  Vitals:    05/19/22 1513   BP: 128/78   BP Location: Left arm   Patient Position: Sitting   Cuff Size: Standard   Pulse: 65   Resp: 16   Temp: (!) 97 3 °F (36 3 °C)   TempSrc: Temporal   SpO2: 99%   Weight: 82 1 kg (181 lb)   Height: 5' 9" (1 753 m)     Body mass index is 26 73 kg/m²  Physical Exam  Constitutional:       General: He is not in acute distress  Appearance: Normal appearance  He is well-developed   He is not ill-appearing  Eyes:      Extraocular Movements: Extraocular movements intact  Pupils: Pupils are equal, round, and reactive to light  Cardiovascular:      Rate and Rhythm: Normal rate and regular rhythm  Pulses: Normal pulses  Heart sounds: Normal heart sounds  No murmur heard  Pulmonary:      Effort: Pulmonary effort is normal       Breath sounds: Normal breath sounds  Abdominal:      General: There is no distension  Tenderness: There is no abdominal tenderness  There is no right CVA tenderness or left CVA tenderness  Musculoskeletal:      Cervical back: Normal range of motion  Neurological:      General: No focal deficit present  Mental Status: He is alert and oriented to person, place, and time  Mental status is at baseline     Psychiatric:         Mood and Affect: Mood normal

## 2022-05-23 ENCOUNTER — TELEPHONE (OUTPATIENT)
Dept: ENDOCRINOLOGY | Facility: CLINIC | Age: 62
End: 2022-05-23

## 2022-05-23 NOTE — TELEPHONE ENCOUNTER
Lm for the patient in [facility] that Jefry Zambrano will no longer be seeing patient in our [place] office   If he could please call the office and verify if he would like to see Dr Reyes instead as she is [place] speaking    - dates offered June 7 and 9th an 12 pm

## 2022-05-27 ENCOUNTER — TELEPHONE (OUTPATIENT)
Dept: FAMILY MEDICINE CLINIC | Facility: CLINIC | Age: 62
End: 2022-05-27

## 2022-05-27 NOTE — TELEPHONE ENCOUNTER
Patient Ph [phone]    He received a call yesterday from our office and left no message, he is returning our call back  Please call him at the number listed above

## 2022-06-09 ENCOUNTER — CONSULT (OUTPATIENT)
Dept: NEPHROLOGY | Facility: CLINIC | Age: 62
End: 2022-06-09
Payer: COMMERCIAL

## 2022-06-09 VITALS
HEIGHT: 69 IN | SYSTOLIC BLOOD PRESSURE: 150 MMHG | BODY MASS INDEX: 26.07 KG/M2 | DIASTOLIC BLOOD PRESSURE: 90 MMHG | WEIGHT: 176 LBS

## 2022-06-09 DIAGNOSIS — N17.9 AKI (ACUTE KIDNEY INJURY) (HCC): Primary | ICD-10-CM

## 2022-06-09 DIAGNOSIS — N28.9 ABNORMAL RENAL FUNCTION: ICD-10-CM

## 2022-06-09 LAB
SL AMB  POCT GLUCOSE, UA: NORMAL
SL AMB LEUKOCYTE ESTERASE,UA: NORMAL
SL AMB POCT BILIRUBIN,UA: NORMAL
SL AMB POCT BLOOD,UA: NORMAL
SL AMB POCT CLARITY,UA: CLEAR
SL AMB POCT COLOR,UA: YELLOW
SL AMB POCT KETONES,UA: NORMAL
SL AMB POCT NITRITE,UA: NORMAL
SL AMB POCT PH,UA: 5
SL AMB POCT SPECIFIC GRAVITY,UA: 1.01
SL AMB POCT URINE PROTEIN: NORMAL
SL AMB POCT UROBILINOGEN: 0.2

## 2022-06-09 PROCEDURE — 81002 URINALYSIS NONAUTO W/O SCOPE: CPT | Performed by: STUDENT IN AN ORGANIZED HEALTH CARE EDUCATION/TRAINING PROGRAM

## 2022-06-09 PROCEDURE — 99204 OFFICE O/P NEW MOD 45 MIN: CPT | Performed by: STUDENT IN AN ORGANIZED HEALTH CARE EDUCATION/TRAINING PROGRAM

## 2022-06-09 PROCEDURE — 1036F TOBACCO NON-USER: CPT | Performed by: STUDENT IN AN ORGANIZED HEALTH CARE EDUCATION/TRAINING PROGRAM

## 2022-06-09 PROCEDURE — 3008F BODY MASS INDEX DOCD: CPT | Performed by: STUDENT IN AN ORGANIZED HEALTH CARE EDUCATION/TRAINING PROGRAM

## 2022-06-09 NOTE — PATIENT INSTRUCTIONS
Thank you for coming to your visit today  As we discussed you kidney function is above normal levels but it is improving , your creatinine is 1 3mg/dL Your electrolytes are normal  Please follow the recommendations below       Recommend low sodium (salt) food    Avoid nonsteroidal anti-inflammatory drugs such as Naprosyn, ibuprofen, Aleve, Advil, Celebrex, Meloxicam (Mobic) etc   You can use Tylenol as needed if you do not have any liver condition to be concerned about    Try to avoid medications such as pantoprazole or  Protonix/Nexium or Esomeprazole)/Prilosec or omeprazole/Prevacid or lansoprazole/AcipHex or Rabeprazole  If you are able to, use Pepcid as this is safer for your kidneys      Try to exercise at least 30 minutes 3 days a week to begin with with an ultimate goal of 5 days a week for at least 30 minutes    Next Visit in 6 months with results   If you need to see us earlier we can change the appointment for you       Ena Galloway MD  Nephrology Attending

## 2022-06-09 NOTE — PROGRESS NOTES
NEPHROLOGY OUTPATIENT CONSULTATION   Chan Ford 64 y o  male MRN: [id number]  Date: 6/9/2022  Reason for consultation:   Chief Complaint   Patient presents with    Consult       ASSESSMENT AND PLAN:  64 y o  male with PMH HTN  who presents for initial consultation for elevated creatinine  Patient is referred by Dr Hayward Longest:    #Non-Oliguric KDIGO MARYANN stage 1     Etiology:hemodynamic changes in the settings of hypotension and pre-renal in the settings of viral infection in May 2022   Baseline creatinine: 1mg/dL 4/2022   Peak creatinine: 1 5mg/dL   Current creatinine:1 3mg/dL trending down    UA:no hematuria, trace proteinuria    Urine sediment: bland    Renal imaging :no hydronephrosis, irregular bladder, BPH   Avoid nephrotoxic agents such as NSAIDs   Repeat Bmp in 1 weeks to monitor trend      #Acid-base Disorder   serum HCO3 27BFHS/X    metabolic alkalosis likely secondary to poor fluid intake    Enforce fluid intake     #Volume status/hypertension:   Volume:euvolemic    Blood pressure:hypertenisve /90mHg, gaol <140/90   Recommend:   Low sodium diet   Continue Hyzaar for now, will hold if no improvement of MARYANN    #Anemia:   Current hemoglobin:11 6mg/dL   At goal     #BPH  · Pending prostate biopsy         HISTORY OF PRESENT ILLNESS:  Chan Ford is a 64 y o  male with PMH HTN  who presents for initial consultation for elevated creatinine  Patient is referred by Dr Raquel Pickard  Normal creatinine was normal in April 2021  Patient was sick at the beginning May, with generalized muscle ache, poor appetite, fever for 10 days, was taking Tylenol  Patient [facility], tested negative for COVID multiple times  Symptoms resolved after 15 days of illness    During the time patient was advised to hold losartan and he restarted a few days ago due to hypertension    REVIEW OF SYSTEMS:    More than 10 point review of systems were obtained and discussed in length with the patient  Complete review of systems were negative / unremarkable except mentioned in the HPI section  Review of Systems - Psychological ROS: negative  Ophthalmic ROS: negative  ENT ROS: negative  Hematological and Lymphatic ROS: negative  Endocrine ROS: negative  Respiratory ROS: no cough, shortness of breath, or wheezing  Cardiovascular ROS: no chest pain or dyspnea on exertion  Gastrointestinal ROS: no abdominal pain, change in bowel habits, or black or bloody stools  Genito-Urinary ROS: no dysuria, trouble voiding, or hematuria  Musculoskeletal ROS: negative  Neurological ROS: no TIA or stroke symptoms  Dermatological ROS: negative     PHYSICAL EXAM:  Vitals:    06/09/22 1201   BP: 150/90   Weight: 79 8 kg (176 lb)   Height: 5' 9" (1 753 m)     Body mass index is 25 99 kg/m²      Physical Exam     General:  no acute distress at this time  Skin:  No acute rash  Eyes:  No scleral icterus and noninjected  ENT:  mucous membranes moist  Neck:  no carotid bruits  Chest:  Clear to auscultation percussion, good respiratory effort, no use of accessory respiratory muscles  CVS:  Regular rate and rhythm without a murmur rub , no jugular venous distention,  Abdomen:  soft and nontender   Extremities:  No clubbing, no cyanosis, no significant lower extremity edema  Neuro:  No gross focality  Psych:  Alert , cooperative       PAST MEDICAL HISTORY:  Past Medical History:   Diagnosis Date    Hypertension     Nasal congestion        PAST SURGICAL HISTORY:  Past Surgical History:   Procedure Laterality Date    APPENDECTOMY         ALLERGIES:  No Known Allergies    SOCIAL HISTORY:  Social History     Substance and Sexual Activity   Alcohol Use Yes    Alcohol/week: 2 0 standard drinks    Types: 2 Glasses of wine per week    Comment: occ     Social History     Substance and Sexual Activity   Drug Use Never     Social History     Tobacco Use   Smoking Status Never Smoker   Smokeless Tobacco Never Used       FAMILY HISTORY:  Family History   Problem Relation Age of Onset    Heart disease Mother     Heart disease Father     Hypertension Father     Diabetes Brother     No Known Problems Brother     No Known Problems Brother     No Known Problems Brother     No Known Problems Brother     No Known Problems Maternal Grandmother     No Known Problems Maternal Grandfather     No Known Problems Paternal Grandmother     No Known Problems Paternal Grandfather        MEDICATIONS:    Current Outpatient Medications:     losartan-hydrochlorothiazide (HYZAAR) 50-12 5 mg per tablet, Take 1 tablet by mouth daily, Disp: 90 tablet, Rfl: 3    azelastine (ASTELIN) 0 1 % nasal spray, 2 sprays into each nostril (Patient not taking: No sig reported), Disp: , Rfl:     fluticasone (FLONASE) 50 mcg/act nasal spray, 2 sprays into each nostril daily (Patient not taking: No sig reported), Disp: 16 g, Rfl: 6    LORazepam (ATIVAN) 1 mg tablet, Take 1 tablet (1 mg total) by mouth once for 1 dose Take 1 hour prior to biopsy (Patient not taking: No sig reported), Disp: 1 tablet, Rfl: 0    Lab Results:   Results for orders placed or performed in visit on 06/09/22   POCT urine dip   Result Value Ref Range    LEUKOCYTE ESTERASE,UA neg     NITRITE,UA neg     SL AMB POCT UROBILINOGEN 0 2     POCT URINE PROTEIN neg      PH,UA 5 0     BLOOD,UA neg     SPECIFIC GRAVITY,UA 1 015     KETONES,UA neg     BILIRUBIN,UA neg     GLUCOSE, UA neg      COLOR,UA yellow     CLARITY,UA clear        Portions of the record may have been created with voice recognition software  Occasional wrong word or "sound a like" substitutions may have occurred due to the inherent limitations of voice recognition software  Read the chart carefully and recognize, using context, where substitutions have occurred

## 2022-06-22 ENCOUNTER — ANESTHESIA (OUTPATIENT)
Dept: GASTROENTEROLOGY | Facility: HOSPITAL | Age: 62
End: 2022-06-22

## 2022-06-22 ENCOUNTER — ANESTHESIA EVENT (OUTPATIENT)
Dept: GASTROENTEROLOGY | Facility: HOSPITAL | Age: 62
End: 2022-06-22

## 2022-06-22 ENCOUNTER — TELEPHONE (OUTPATIENT)
Dept: NEPHROLOGY | Facility: CLINIC | Age: 62
End: 2022-06-22

## 2022-06-22 ENCOUNTER — HOSPITAL ENCOUNTER (OUTPATIENT)
Dept: GASTROENTEROLOGY | Facility: HOSPITAL | Age: 62
Setting detail: OUTPATIENT SURGERY
Discharge: HOME | End: 2022-06-22
Attending: SURGERY
Payer: COMMERCIAL

## 2022-06-22 VITALS
WEIGHT: 172 LBS | DIASTOLIC BLOOD PRESSURE: 65 MMHG | HEART RATE: 56 BPM | TEMPERATURE: 97 F | HEIGHT: 69 IN | OXYGEN SATURATION: 100 % | SYSTOLIC BLOOD PRESSURE: 99 MMHG | RESPIRATION RATE: 19 BRPM | BODY MASS INDEX: 25.48 KG/M2

## 2022-06-22 DIAGNOSIS — Z12.11 ENCOUNTER FOR SCREENING COLONOSCOPY: ICD-10-CM

## 2022-06-22 PROCEDURE — 88305 TISSUE EXAM BY PATHOLOGIST: CPT | Performed by: PATHOLOGY

## 2022-06-22 PROCEDURE — 45385 COLONOSCOPY W/LESION REMOVAL: CPT | Performed by: SURGERY

## 2022-06-22 RX ORDER — SODIUM CHLORIDE 9 MG/ML
INJECTION, SOLUTION INTRAVENOUS CONTINUOUS PRN
Status: DISCONTINUED | OUTPATIENT
Start: 2022-06-22 | End: 2022-06-22

## 2022-06-22 RX ORDER — PROPOFOL 10 MG/ML
INJECTION, EMULSION INTRAVENOUS CONTINUOUS PRN
Status: DISCONTINUED | OUTPATIENT
Start: 2022-06-22 | End: 2022-06-22

## 2022-06-22 RX ORDER — PROPOFOL 10 MG/ML
INJECTION, EMULSION INTRAVENOUS AS NEEDED
Status: DISCONTINUED | OUTPATIENT
Start: 2022-06-22 | End: 2022-06-22

## 2022-06-22 RX ADMIN — PROPOFOL 25 MG: 10 INJECTION, EMULSION INTRAVENOUS at 07:33

## 2022-06-22 RX ADMIN — PROPOFOL 25 MG: 10 INJECTION, EMULSION INTRAVENOUS at 08:02

## 2022-06-22 RX ADMIN — PROPOFOL 100 MCG/KG/MIN: 10 INJECTION, EMULSION INTRAVENOUS at 07:44

## 2022-06-22 RX ADMIN — PROPOFOL 25 MG: 10 INJECTION, EMULSION INTRAVENOUS at 08:03

## 2022-06-22 RX ADMIN — PROPOFOL 25 MG: 10 INJECTION, EMULSION INTRAVENOUS at 07:32

## 2022-06-22 RX ADMIN — PROPOFOL 25 MG: 10 INJECTION, EMULSION INTRAVENOUS at 07:38

## 2022-06-22 RX ADMIN — PROPOFOL 25 MG: 10 INJECTION, EMULSION INTRAVENOUS at 07:39

## 2022-06-22 RX ADMIN — PROPOFOL 25 MG: 10 INJECTION, EMULSION INTRAVENOUS at 07:34

## 2022-06-22 RX ADMIN — PROPOFOL 25 MG: 10 INJECTION, EMULSION INTRAVENOUS at 07:41

## 2022-06-22 RX ADMIN — PROPOFOL 25 MG: 10 INJECTION, EMULSION INTRAVENOUS at 07:36

## 2022-06-22 RX ADMIN — PROPOFOL 25 MG: 10 INJECTION, EMULSION INTRAVENOUS at 07:31

## 2022-06-22 RX ADMIN — PROPOFOL 25 MG: 10 INJECTION, EMULSION INTRAVENOUS at 07:37

## 2022-06-22 RX ADMIN — PROPOFOL 25 MG: 10 INJECTION, EMULSION INTRAVENOUS at 07:40

## 2022-06-22 RX ADMIN — PROPOFOL 25 MG: 10 INJECTION, EMULSION INTRAVENOUS at 07:35

## 2022-06-22 RX ADMIN — PROPOFOL 50 MG: 10 INJECTION, EMULSION INTRAVENOUS at 07:29

## 2022-06-22 RX ADMIN — SODIUM CHLORIDE: 0.9 INJECTION, SOLUTION INTRAVENOUS at 07:15

## 2022-06-22 RX ADMIN — PROPOFOL 50 MG: 10 INJECTION, EMULSION INTRAVENOUS at 07:30

## 2022-06-22 NOTE — QUICK NOTE
Assessment/Plan:  57-year-old male with a need for screening colonoscopy  This is being scheduled     No problem-specific Assessment & Plan notes found for this encounter          Diagnoses and all orders for this visit:     Encounter for screening colonoscopy            Subjective:       Patient ID: Savita Goldsmith is a 64 y o  male      57-year-old male who had a colonoscopy about 10 years ago which was negative  He is sent in for screening colonoscopy now  He has no history of blood in the stool or change in his stool habits  No history of colon carcinoma in the family        The following portions of the patient's history were reviewed and updated as appropriate: allergies, current medications, past family history, past medical history, past social history, past surgical history and problem list      Review of Systems   Constitutional:        Covid shots x 3  ? Had covid   HENT: Negative  Eyes:        Cataracts starting   Respiratory:        Sinus  Never smoked   Cardiovascular:        Hypertension   Gastrointestinal:        Appy out  Colonoscopy 10 years ago, fine  No family hx colon ca   Endocrine:        Borderline DM II   Genitourinary:        Nocturia x 1  psa high   Musculoskeletal: Positive for arthralgias  Neurological: Negative  Hematological: Negative  Psychiatric/Behavioral:        Anxiety          Objective:        /88 (BP Location: Left arm, Patient Position: Sitting, Cuff Size: Adult)   Pulse 75   Temp (!) 97 2 °F (36 2 °C)   Resp 18   Ht 5' 9" (1 753 m)   Wt 83 kg (183 lb)   SpO2 98%   BMI 27 02 kg/m²             Physical Exam  Vitals reviewed  Constitutional:       Appearance: Normal appearance  He is normal weight  HENT:      Head: Normocephalic and atraumatic  Eyes:      General: No scleral icterus  Conjunctiva/sclera: Conjunctivae normal    Cardiovascular:      Rate and Rhythm: Normal rate and regular rhythm  Heart sounds: Normal heart sounds   No murmur heard        Pulmonary:      Effort: Pulmonary effort is normal  No respiratory distress  Breath sounds: Normal breath sounds  No stridor  No wheezing, rhonchi or rales  Abdominal:      Palpations: Abdomen is soft  There is no mass  Hernia: No hernia is present  Musculoskeletal:         General: Normal range of motion  Cervical back: Normal range of motion  Lymphadenopathy:      Cervical: No cervical adenopathy  Skin:     General: Skin is warm and dry  Coloration: Skin is not jaundiced  Neurological:      Mental Status: He is alert and oriented to person, place, and time  Psychiatric:         Mood and Affect: Mood normal          Behavior: Behavior normal          Thought Content: Thought content normal          Judgment: Judgment normal      Since the above was written, there have been no changes  Heart exam is RSR, no murmur    Lungs are clear without rales, ronchi or wheezes

## 2022-06-22 NOTE — ANESTHESIA PREPROCEDURE EVALUATION
Procedure:  COLONOSCOPY    Relevant Problems   ANESTHESIA (within normal limits)      CARDIO   (+) Primary hypertension      ENDO   (-) Diabetes mellitus, type 2 (HCC)   (-) Hyperthyroidism   (-) Hypothyroidism      GI/HEPATIC   (-) Gastroesophageal reflux disease      /RENAL (within normal limits)      HEMATOLOGY (within normal limits)      MUSCULOSKELETAL (within normal limits)      NEURO/PSYCH (within normal limits)      PULMONARY   (-) Asthma   (-) Sleep apnea        Physical Exam    Airway    Mallampati score: II  TM Distance: >3 FB  Neck ROM: full     Dental   No notable dental hx     Cardiovascular  Rhythm: regular, Rate: normal, No murmur,     Pulmonary  Breath sounds clear to auscultation,     Other Findings        Anesthesia Plan  ASA Score- 2     Anesthesia Type- IV sedation with anesthesia with ASA Monitors  Additional Monitors:   Airway Plan:           Plan Factors-Exercise tolerance (METS): >4 METS  Chart reviewed  Existing labs reviewed  Patient summary reviewed  Patient is not a current smoker  Induction- intravenous  Postoperative Plan-     Informed Consent- Anesthetic plan and risks discussed with patient  I personally reviewed this patient with the CRNA  Discussed and agreed on the Anesthesia Plan with the CRNA  Kaleb Burton

## 2022-06-22 NOTE — ANESTHESIA POSTPROCEDURE EVALUATION
Post-Op Assessment Note    CV Status:  Stable    Pain management: adequate     Mental Status:  Arousable and sleepy   Hydration Status:  Euvolemic   PONV Controlled:  Controlled   Airway Patency:  Patent      Post Op Vitals Reviewed: Yes      Staff: CRNA         No complications documented      BP 92/59 (06/22/22 0813)    Temp 98 °F (36 7 °C) (06/22/22 0813)    Pulse 74 (06/22/22 0813)   Resp 16 (06/22/22 0813)    SpO2 96 % (06/22/22 0813)

## 2022-06-22 NOTE — TELEPHONE ENCOUNTER
Left message to schedule follow up appointment with Ronna Gomez in [place]  This is the first attempt  [place] [address] [facility],  [address]        Physical Therapy  Cancellation/No-show Note  Patient Name:  Shannon Collins  :  1962   Date:  2021  Cancelled visits to date: 1  No-shows to date: 1    For today's appointment patient:  []  Cancelled  []  Rescheduled appointment  [x]  No-show     Reason given by patient:  []  Patient ill  []  Conflicting appointment  []  No transportation    []  Conflict with work  []  No reason given  []  Other:     Comments:      Electronically signed by:  Teagan Haley PT

## 2022-07-01 NOTE — TELEPHONE ENCOUNTER
Left message to schedule December follow up appointment with [facility] in [place]  This is the 2nd attempt

## 2022-07-06 ENCOUNTER — TELEPHONE (OUTPATIENT)
Dept: NEPHROLOGY | Facility: CLINIC | Age: 62
End: 2022-07-06

## 2022-07-06 NOTE — TELEPHONE ENCOUNTER
Patient called stating he was under the impression he did not need a follow up in December based on his lab work  I have scheduled him anyway just in case, but please confirm whether or not the patient needs to be seen again

## 2022-07-06 NOTE — TELEPHONE ENCOUNTER
Left message to schedule December follow up appointment with Ashleigh Flores in [place]  This is the 3rd attempt      Sent letter via [facility] regarding not being able to contact and call the office

## 2022-07-08 DIAGNOSIS — R97.20 ELEVATED PSA: Primary | ICD-10-CM

## 2022-07-08 RX ORDER — LEVOFLOXACIN 500 MG/1
500 TABLET, FILM COATED ORAL EVERY 24 HOURS
Qty: 3 TABLET | Refills: 0 | Status: SHIPPED | OUTPATIENT
Start: 2022-07-08 | End: 2022-07-11

## 2022-08-30 ENCOUNTER — ANESTHESIA EVENT (OUTPATIENT)
Dept: PERIOP | Facility: AMBULARY SURGERY CENTER | Age: 62
End: 2022-08-30
Payer: COMMERCIAL

## 2022-09-06 ENCOUNTER — APPOINTMENT (OUTPATIENT)
Dept: LAB | Facility: HOSPITAL | Age: 62
End: 2022-09-06
Attending: UROLOGY
Payer: COMMERCIAL

## 2022-09-06 DIAGNOSIS — R97.20 ELEVATED PROSTATE SPECIFIC ANTIGEN (PSA): ICD-10-CM

## 2022-09-06 LAB
ALBUMIN SERPL BCP-MCNC: 4 G/DL (ref 3.5–5)
ALP SERPL-CCNC: 73 U/L (ref 34–104)
ALT SERPL W P-5'-P-CCNC: 17 U/L (ref 7–52)
AMORPH URATE CRY URNS QL MICRO: ABNORMAL /HPF
ANION GAP SERPL CALCULATED.3IONS-SCNC: 5 MMOL/L (ref 4–13)
AST SERPL W P-5'-P-CCNC: 19 U/L (ref 13–39)
BACTERIA UR QL AUTO: ABNORMAL /HPF
BASOPHILS # BLD AUTO: 0.05 THOUSANDS/ΜL (ref 0–0.1)
BASOPHILS NFR BLD AUTO: 1 % (ref 0–1)
BILIRUB SERPL-MCNC: 0.61 MG/DL (ref 0.2–1)
BILIRUB UR QL STRIP: NEGATIVE
BUN SERPL-MCNC: 20 MG/DL (ref 5–25)
CALCIUM SERPL-MCNC: 9.1 MG/DL (ref 8.4–10.2)
CHLORIDE SERPL-SCNC: 104 MMOL/L (ref 96–108)
CHOLEST SERPL-MCNC: 169 MG/DL
CLARITY UR: ABNORMAL
CO2 SERPL-SCNC: 30 MMOL/L (ref 21–32)
COLOR UR: YELLOW
CREAT SERPL-MCNC: 1.13 MG/DL (ref 0.6–1.3)
EOSINOPHIL # BLD AUTO: 0.42 THOUSAND/ΜL (ref 0–0.61)
EOSINOPHIL NFR BLD AUTO: 5 % (ref 0–6)
ERYTHROCYTE [DISTWIDTH] IN BLOOD BY AUTOMATED COUNT: 13.9 % (ref 11.6–15.1)
GFR SERPL CREATININE-BSD FRML MDRD: 69 ML/MIN/1.73SQ M
GLUCOSE P FAST SERPL-MCNC: 101 MG/DL (ref 65–99)
GLUCOSE UR STRIP-MCNC: NEGATIVE MG/DL
HCT VFR BLD AUTO: 42.8 % (ref 36.5–49.3)
HDLC SERPL-MCNC: 43 MG/DL
HGB BLD-MCNC: 14.2 G/DL (ref 12–17)
HGB UR QL STRIP.AUTO: NEGATIVE
IMM GRANULOCYTES # BLD AUTO: 0.02 THOUSAND/UL (ref 0–0.2)
IMM GRANULOCYTES NFR BLD AUTO: 0 % (ref 0–2)
KETONES UR STRIP-MCNC: NEGATIVE MG/DL
LDLC SERPL CALC-MCNC: 100 MG/DL (ref 0–100)
LEUKOCYTE ESTERASE UR QL STRIP: NEGATIVE
LYMPHOCYTES # BLD AUTO: 2.34 THOUSANDS/ΜL (ref 0.6–4.47)
LYMPHOCYTES NFR BLD AUTO: 28 % (ref 14–44)
MCH RBC QN AUTO: 30.3 PG (ref 26.8–34.3)
MCHC RBC AUTO-ENTMCNC: 33.2 G/DL (ref 31.4–37.4)
MCV RBC AUTO: 91 FL (ref 82–98)
MONOCYTES # BLD AUTO: 0.55 THOUSAND/ΜL (ref 0.17–1.22)
MONOCYTES NFR BLD AUTO: 7 % (ref 4–12)
NEUTROPHILS # BLD AUTO: 4.98 THOUSANDS/ΜL (ref 1.85–7.62)
NEUTS SEG NFR BLD AUTO: 59 % (ref 43–75)
NITRITE UR QL STRIP: NEGATIVE
NON-SQ EPI CELLS URNS QL MICRO: ABNORMAL /HPF
NONHDLC SERPL-MCNC: 126 MG/DL
NRBC BLD AUTO-RTO: 0 /100 WBCS
PH UR STRIP.AUTO: 7.5 [PH]
PLATELET # BLD AUTO: 239 THOUSANDS/UL (ref 149–390)
PMV BLD AUTO: 11.5 FL (ref 8.9–12.7)
POTASSIUM SERPL-SCNC: 4 MMOL/L (ref 3.5–5.3)
PROT SERPL-MCNC: 7 G/DL (ref 6.4–8.4)
PROT UR STRIP-MCNC: NEGATIVE MG/DL
RBC # BLD AUTO: 4.69 MILLION/UL (ref 3.88–5.62)
RBC #/AREA URNS AUTO: ABNORMAL /HPF
SODIUM SERPL-SCNC: 139 MMOL/L (ref 135–147)
SP GR UR STRIP.AUTO: 1.01 (ref 1–1.03)
TRIGL SERPL-MCNC: 130 MG/DL
UROBILINOGEN UR QL STRIP.AUTO: 0.2 E.U./DL
WBC # BLD AUTO: 8.36 THOUSAND/UL (ref 4.31–10.16)
WBC #/AREA URNS AUTO: ABNORMAL /HPF

## 2022-09-06 PROCEDURE — 36415 COLL VENOUS BLD VENIPUNCTURE: CPT

## 2022-09-06 PROCEDURE — 87086 URINE CULTURE/COLONY COUNT: CPT

## 2022-09-06 PROCEDURE — 85025 COMPLETE CBC W/AUTO DIFF WBC: CPT

## 2022-09-08 ENCOUNTER — TELEPHONE (OUTPATIENT)
Dept: UROLOGY | Facility: AMBULATORY SURGERY CENTER | Age: 62
End: 2022-09-08

## 2022-09-08 LAB — BACTERIA UR CULT: NORMAL

## 2022-09-08 NOTE — TELEPHONE ENCOUNTER
Called and left detailed message about the prep for patients upcoming procedure  Patient does need to get an EKG for this procedure  Patient to call back if any questions  I am calling in regards to your prep instructions for your appointment at the [facility]  on 9/13/22 with Dr Lamin Albarran  under IV Se  We need you to please make sure to stop all blood thinners (including multivitamins) ( Eliquis or Mayte Ruffing should be clarified with Cardiology before stopping) 7 day prior to you appointment  Pt should have nothing to eat after midnight the day before the procedure  The pt will need to make sure they have a   Finally the Pt will need to use an enema at least 2 hour prior to the appointment   If you have any questions please call [phone] and ask for Jordan Lord "

## 2022-09-12 NOTE — ANESTHESIA PREPROCEDURE EVALUATION
Procedure:  TRANSPERINEAL MRI FUSION  BIOPSY PROSTATE (N/A Perineum)    Relevant Problems   CARDIO   (+) Primary hypertension      Other   (+) Elevated PSA   (+) Prediabetes        Physical Exam    Airway    Mallampati score: II  TM Distance: >3 FB  Neck ROM: full     Dental   No notable dental hx     Cardiovascular      Pulmonary      Other Findings         Latest Reference Range & Units 09/06/22 09:45   Sodium 135 - 147 mmol/L 139   Potassium 3 5 - 5 3 mmol/L 4 0   Chloride 96 - 108 mmol/L 104   CO2 21 - 32 mmol/L 30   Anion Gap 4 - 13 mmol/L 5   BUN 5 - 25 mg/dL 20   Creatinine 0 60 - 1 30 mg/dL 1 13   GLUCOSE FASTING 65 - 99 mg/dL 101 (H)   Calcium 8 4 - 10 2 mg/dL 9 1   AST 13 - 39 U/L 19   ALT 7 - 52 U/L 17   Alkaline Phosphatase 34 - 104 U/L 73   Total Protein 6 4 - 8 4 g/dL 7 0   Albumin 3 5 - 5 0 g/dL 4 0   TOTAL BILIRUBIN 0 20 - 1 00 mg/dL 0 61   eGFR ml/min/1 73sq m 69        Latest Reference Range & Units 09/06/22 09:45   WBC 4 31 - 10 16 Thousand/uL 8 36   Red Blood Cell Count 3 88 - 5 62 Million/uL 4 69   Hemoglobin 12 0 - 17 0 g/dL 14 2   HCT 36 5 - 49 3 % 42 8   MCV 82 - 98 fL 91   MCH 26 8 - 34 3 pg 30 3   MCHC 31 4 - 37 4 g/dL 33 2   RDW 11 6 - 15 1 % 13 9   Platelet Count 353 - 390 Thousands/uL 239   MPV 8 9 - 12 7 fL 11 5   nRBC /100 WBCs 0         Anesthesia Plan  ASA Score- 2     Anesthesia Type- IV sedation with anesthesia with ASA Monitors  Additional Monitors:   Airway Plan:     Comment: NPO after MN  Plan Factors-Exercise tolerance (METS): >4 METS  Chart reviewed  Patient summary reviewed  Patient is not a current smoker  Induction- intravenous  Postoperative Plan- Plan for postoperative opioid use  Informed Consent- Anesthetic plan and risks discussed with patient  I personally reviewed this patient with the CRNA  Discussed and agreed on the Anesthesia Plan with the CRNA  Marycarmen Pham

## 2022-09-13 ENCOUNTER — ANESTHESIA (OUTPATIENT)
Dept: PERIOP | Facility: AMBULARY SURGERY CENTER | Age: 62
End: 2022-09-13
Payer: COMMERCIAL

## 2022-09-13 ENCOUNTER — HOSPITAL ENCOUNTER (OUTPATIENT)
Facility: AMBULARY SURGERY CENTER | Age: 62
Setting detail: OUTPATIENT SURGERY
Discharge: HOME | End: 2022-09-13
Attending: UROLOGY | Admitting: UROLOGY
Payer: COMMERCIAL

## 2022-09-13 VITALS
DIASTOLIC BLOOD PRESSURE: 83 MMHG | SYSTOLIC BLOOD PRESSURE: 145 MMHG | RESPIRATION RATE: 16 BRPM | HEART RATE: 58 BPM | TEMPERATURE: 96.7 F | OXYGEN SATURATION: 98 %

## 2022-09-13 DIAGNOSIS — R97.20 ELEVATED PROSTATE SPECIFIC ANTIGEN (PSA): ICD-10-CM

## 2022-09-13 PROCEDURE — G0416 PROSTATE BIOPSY, ANY MTHD: HCPCS | Performed by: PATHOLOGY

## 2022-09-13 PROCEDURE — 55700 PR BIOPSY OF PROSTATE,NEEDLE/PUNCH: CPT | Performed by: UROLOGY

## 2022-09-13 PROCEDURE — NC001 PR NO CHARGE: Performed by: UROLOGY

## 2022-09-13 RX ORDER — SODIUM CHLORIDE, SODIUM LACTATE, POTASSIUM CHLORIDE, CALCIUM CHLORIDE 600; 310; 30; 20 MG/100ML; MG/100ML; MG/100ML; MG/100ML
INJECTION, SOLUTION INTRAVENOUS CONTINUOUS PRN
Status: DISCONTINUED | OUTPATIENT
Start: 2022-09-13 | End: 2022-09-13

## 2022-09-13 RX ORDER — FENTANYL CITRATE 50 UG/ML
INJECTION, SOLUTION INTRAMUSCULAR; INTRAVENOUS AS NEEDED
Status: DISCONTINUED | OUTPATIENT
Start: 2022-09-13 | End: 2022-09-13

## 2022-09-13 RX ORDER — FENTANYL CITRATE/PF 50 MCG/ML
25 SYRINGE (ML) INJECTION
Status: DISCONTINUED | OUTPATIENT
Start: 2022-09-13 | End: 2022-09-13 | Stop reason: HOSPADM

## 2022-09-13 RX ORDER — MIDAZOLAM HYDROCHLORIDE 2 MG/2ML
INJECTION, SOLUTION INTRAMUSCULAR; INTRAVENOUS AS NEEDED
Status: DISCONTINUED | OUTPATIENT
Start: 2022-09-13 | End: 2022-09-13

## 2022-09-13 RX ORDER — PROPOFOL 10 MG/ML
INJECTION, EMULSION INTRAVENOUS AS NEEDED
Status: DISCONTINUED | OUTPATIENT
Start: 2022-09-13 | End: 2022-09-13

## 2022-09-13 RX ORDER — PROPOFOL 10 MG/ML
INJECTION, EMULSION INTRAVENOUS CONTINUOUS PRN
Status: DISCONTINUED | OUTPATIENT
Start: 2022-09-13 | End: 2022-09-13

## 2022-09-13 RX ORDER — ONDANSETRON 2 MG/ML
4 INJECTION INTRAMUSCULAR; INTRAVENOUS ONCE AS NEEDED
Status: DISCONTINUED | OUTPATIENT
Start: 2022-09-13 | End: 2022-09-13 | Stop reason: HOSPADM

## 2022-09-13 RX ORDER — CEFAZOLIN SODIUM 1 G/50ML
1000 SOLUTION INTRAVENOUS ONCE
Status: COMPLETED | OUTPATIENT
Start: 2022-09-13 | End: 2022-09-13

## 2022-09-13 RX ORDER — ONDANSETRON 2 MG/ML
INJECTION INTRAMUSCULAR; INTRAVENOUS AS NEEDED
Status: DISCONTINUED | OUTPATIENT
Start: 2022-09-13 | End: 2022-09-13

## 2022-09-13 RX ORDER — LIDOCAINE HYDROCHLORIDE 20 MG/ML
INJECTION, SOLUTION INFILTRATION; PERINEURAL AS NEEDED
Status: DISCONTINUED | OUTPATIENT
Start: 2022-09-13 | End: 2022-09-13 | Stop reason: HOSPADM

## 2022-09-13 RX ADMIN — PROPOFOL 100 MCG/KG/MIN: 10 INJECTION, EMULSION INTRAVENOUS at 09:01

## 2022-09-13 RX ADMIN — PROPOFOL 100 MG: 10 INJECTION, EMULSION INTRAVENOUS at 09:01

## 2022-09-13 RX ADMIN — CEFAZOLIN SODIUM 1000 MG: 1 SOLUTION INTRAVENOUS at 09:00

## 2022-09-13 RX ADMIN — ONDANSETRON 4 MG: 2 INJECTION INTRAMUSCULAR; INTRAVENOUS at 09:01

## 2022-09-13 RX ADMIN — FENTANYL CITRATE 25 MCG: 50 INJECTION, SOLUTION INTRAMUSCULAR; INTRAVENOUS at 09:05

## 2022-09-13 RX ADMIN — MIDAZOLAM HYDROCHLORIDE 2 MG: 1 INJECTION, SOLUTION INTRAMUSCULAR; INTRAVENOUS at 08:57

## 2022-09-13 RX ADMIN — SODIUM CHLORIDE, SODIUM LACTATE, POTASSIUM CHLORIDE, AND CALCIUM CHLORIDE: .6; .31; .03; .02 INJECTION, SOLUTION INTRAVENOUS at 07:20

## 2022-09-13 NOTE — OP NOTE
OPERATIVE REPORT  PATIENT NAME: Marycruz Metzger    :  1960  MRN: [id number]  Pt Location: [facility] OR ROOM 04    SURGERY DATE: 2022    Surgeon(s) and Role:     * Lucy Elizabeth MD - Primary    Preop Diagnosis:  Elevated prostate specific antigen (PSA) [R97 20]    Post-Op Diagnosis Codes:     * Elevated prostate specific antigen (PSA) [R97 20]    Procedure(s) (LRB):  TRANSPERINEAL MRI FUSION  BIOPSY PROSTATE (N/A)    Specimen(s):  ID Type Source Tests Collected by Time Destination   1 : RIGHT ANTERIOR MEDIAL Tissue Prostate TISSUE EXAM Lucy Elizabeth MD 2022 0476    2 : RIGHT BASE Tissue Prostate TISSUE EXAM Lucy Elizabeth MD 2022 8055    3 : RIGHT POSTERIOR MEDIAL Tissue Prostate TISSUE EXAM Lucy Elizabeth MD 2022 0714    4 : LEFT BASE Tissue Prostate TISSUE EXAM Lucy Elizabeth MD 2022 6675    5 : LEFT POSTERIOR MEDIAL Tissue Prostate TISSUE EXAM Lucy Elizabeth MD 2022 9985    6 : LEFT POSTERIOR LATERAL Tissue Prostate TISSUE EXAM Lucy Elizabeth MD 2022 8893    7 : LEFT ANTERIOR LATERAL Tissue Prostate TISSUE EXAM Lucy Elizabeth MD 2022 3241    8 : LEFT ANTERIOR MEDIAL Tissue Prostate TISSUE EXAM Lucy Elizabeth MD 2022 0714    9 : RIGHT POSTERIOR LATERAL Tissue Prostate TISSUE EXAM Lucy Elizabeth MD 2022 3907    10 : RIGHT ANTERIOR LATERAL Tissue Prostate TISSUE EXAM Lucy Elizabeth MD 2022 0714    11 : REGION OF INTEREST - RIGHT POSTERIOR Tissue Prostate TISSUE EXAM Lucy Elizabeth MD 2022 0908        Estimated Blood Loss:   Minimal    Drains:  * No LDAs found *    Anesthesia Type:   IV Sedation with Anesthesia    Operative Indications:  Elevated prostate specific antigen (PSA) [R97 20]      Operative Findings:  Very small region of interest at right posterior aspect of prostate biopsied 7 times  39 biopsies taken in total    Complications:   None    Procedure and Technique:    Procedure was transperineal biopsy utilizing the Precision Point perineal access device    Joselyn Castillo is a 64 y o  male with history of prostate cancer on surveillance  He presents after multiparametric MRI was obtained of the prostate  There were lesion(s) concerning so the patient was scheduled for transperineal fusion style biopsy  He was pretreated with Ancef antibiotic  He was met in the prep and hold area and the procedure along with its risks and benefits were discussed and reviewed  Patient signed an informed consent  Transferred to OR  He was placed in dorsal lithotomy with care to pad all pressure points  His perineum and genitalia were prepped with a ChloraPrep solution  Sterile Ioban was placed over the perineum above the rectum  Biplanar transrectal ultrasound was placed in the patient's rectum  With the assistance of the technician, the prior obtained MRI images which had been form added for the abnormal lesions were mapped to the real-time ultrasound  In the midportion of the left and right prostate, a tyler was denoted in the perineal skin  An associated skin wheal was created with 5 cc of 1% lidocaine plain in both of these mid lobe locations on either side  The PrecisionPoint device was then introduced into the left perineal subcutaneous tissue  We visualized the tip of the needle  Needle was introduced through the subcutaneous fat and into the pelvic floor muscle where a perineal pudendal block was performed with additional 5 cc of 1% lidocaine  This was repeated on the patient's right side  Utilizing the [place] [employer] access device, the perineum was access via finder needle  Ultrasound and Uronav guidance were utilized to place the needle into the MRI lesion at right posterior with several (7) biopsies taken  We confirmed good position of the needle strikes utilizing the fusion software        On the right side of the prostate, we performed serial biopsies of the right posterior medial peripheral zone, right posterior lateral peripheral zone and moving up the anterior horn to the right anterior lateral and right anteromedial zone  Separate specimen was taken from the right-sided prostate base  Several areas had multiple cores taken  The PrecisionPoint device was repositioned into the left perineal stab  The biopsies were undertaken in the same fashion on the left side  Left posterior medial, left posterior lateral, left anterior lateral, left anteromedial and left base  In total 39 biopsies were taken  The transplant rectal ultrasound probe was removed  The [place] was removed from the skin  Direct pressure was held for 2 minutes for hemostasis  At the completion of the procedure, the patient was taken out of dorsal lithotomy, extubated and transferred to PACU in good condition  Overall the patient tolerated the procedure and there were no complications  Plan-the patient will return for biopsy pathology review in 2 weeks            SIGNATURE: [unfilled]  DATE: September 13, 2022  TIME: 9:29 AM

## 2022-09-13 NOTE — H&P
UROLOGY HISTORY AND PHYSICAL     Patient Identifiers: Giacomo Garcia (MRN [id number])      Date of Service: 9/13/2022        ASSESSMENT:     64 y o  old male with  elevated PSA and MRI PiraDS 4 lesion with negative biopsy in 2019  PLAN:     MRI guided fusion biopsy      History of Present Illness:     Giacomo Garcia is a 64 y o  old with a history of elevated PSA  Patient previously followed with urology in [place] for elevated PSA  Patient was found to have an elevated PSA  He underwent a transrectal ultrasound-guided prostate biopsy 12/18/2019 which revealed active chronic inflammation without any findings of malignancy  Patient did have complications of post biopsy sepsis requiring hospitalization and IV antibiotics  Patient is very hesitant to have an another biopsy as a result of this      PSA trend:  7 27 (11/25/21)   6 01 with 12% free PSA (4/5/22)     mpMRI (4/7/22) showing PIRADS 4 lesion of the right posterior peripheral zone  Prostate glad 62g  Hypointense lesion of the right iliac bone incompletely evaluated       Patient is overall comfortable with his lower urinary tract symptoms  Denies any dysuria, gross hematuria, or difficulty voiding  PVR at last visit revealed complete bladder emptying     Medical comorbidities include HTN, prediabetes       9/6/22 ucx <10k mixed    Past Medical, Past Surgical History:     Past Medical History:   Diagnosis Date    Hypertension     Nasal congestion    :    Past Surgical History:   Procedure Laterality Date    APPENDECTOMY      COLONOSCOPY     :    Medications, Allergies:     Current Facility-Administered Medications:     ceFAZolin (ANCEF) IVPB (premix in dextrose) 1,000 mg 50 mL, 1,000 mg, Intravenous, Once, Dayanara Gallo MD    Facility-Administered Medications Ordered in Other Encounters:     lactated ringers infusion, , Intravenous, Continuous PRN, Malia Aguilar CRNA, New Bag at 09/13/22 0720    Allergies:  No Known Allergies:    Social and Family History:   Social History:   Social History     Tobacco Use    Smoking status: Never Smoker    Smokeless tobacco: Never Used   Vaping Use    Vaping Use: Never used   Substance Use Topics    Alcohol use: Yes     Alcohol/week: 2 0 standard drinks     Types: 2 Glasses of wine per week     Comment: occ    Drug use: Never     Social History     Tobacco Use   Smoking Status Never Smoker   Smokeless Tobacco Never Used       Family History:  Family History   Problem Relation Age of Onset    Heart disease Mother     Heart disease Father     Hypertension Father     Diabetes Brother     No Known Problems Brother     No Known Problems Brother     No Known Problems Brother     No Known Problems Brother     No Known Problems Maternal Grandmother     No Known Problems Maternal Grandfather     No Known Problems Paternal Grandmother     No Known Problems Paternal Grandfather    :     Review of Systems:     General: Fever, chills, or night sweats: negative  Cardiac: Negative for chest pain  Pulmonary: Negative for shortness of breath  Gastrointestinal: Abdominal pain negative  Nausea, vomiting, or diarrhea negative  Genitourinary: See HPI above  Patient does nothave hematuria  All other systems queried were negative  Physical Exam:   General: Patient is pleasant and in NAD  Awake and alert  /93   Pulse 65   Temp (!) 97 3 °F (36 3 °C) (Temporal)   Resp 18   SpO2 98%   HEENT:  Normocephalic atraumatic  Cardiac:  Regular rate and rhythm, Peripheral edema: negative  Pulmonary: Non-labored breathing, CTAB  Abdomen: Soft, non-tender, non-distended  No surgical scars  No masses, tenderness, hernias noted  Genitourinary: negative CVA tenderness, neg suprapubic tenderness    Extremities: normal movement in all 4       Labs:     Lab Results   Component Value Date    HGB 14 2 09/06/2022    HCT 42 8 09/06/2022    WBC 8 36 09/06/2022     09/06/2022   ]    Lab Results   Component Value Date    K 4 0 09/06/2022     09/06/2022    CO2 30 09/06/2022    BUN 20 09/06/2022    CREATININE 1 13 09/06/2022    CALCIUM 9 1 09/06/2022   ]    Imaging:   I personally reviewed the images and report of the following studies, and reviewed them with the patient:    MRI prostate    Thank you for allowing me to participate in this patients care  Please do not hesitate to call with any additional questions    Joe Lo MD

## 2022-09-13 NOTE — DISCHARGE INSTRUCTIONS
Mr Jose Fox,      Your perineal biopsy procedure went well  We were able to obtain the samples in the areas we try to target and have sent these to Pathology for their review  The results should be back in 5-7 business days  There should be an appointment set up to discuss the results in person  Some blood in the urine is normal for approximately 1 week after surgery  It can last in the ejaculate for up to 1 month  Most patients do not need prescription medications after this procedure (including no antibiotics or narcotic pain medications)  Please try taking Tylenol and Motrin over-the-counter to help with discomfort  If you are having significant pain issues please contact me  Please call us immediately if you are having fevers or chills or feel like you have a infection  Some patients can have difficulty with voiding after a biopsy because of swelling of the prostate which can block the urethra  This usually improves with time but if you are having difficulty voiding please let us now as we may need to temporarily insert a catheter  You have any questions or concerns please do not hesitate to call us, [phone]  Martha Ho MD      Prostate Biopsy   WHAT YOU NEED TO KNOW:   A prostate biopsy is a procedure to remove samples of tissue from your prostate gland  The prostate is a male sex gland that makes fluid found in semen  It is located just below the bladder  After the samples are removed, they are sent to a lab and tested for cancer  DISCHARGE INSTRUCTIONS:   Seek care immediately if:   You have heavy bleeding from your urethra  You urinate very little or not at all  You have pain from your procedure that gets worse, even after you take pain medicine  Call your urologist or doctor if:   You have a fever or chills  You feel pain or burning when you urinate  Your urine is cloudy or smells bad      You have questions or concerns about your condition or care  Medicines: You may need any of the following:  Antibiotics  help prevent or treat a bacterial infection  Acetaminophen  decreases pain and fever  It is available without a doctor's order  Ask how much to take and how often to take it  Follow directions  Read the labels of all other medicines you are using to see if they also contain acetaminophen, or ask your doctor or pharmacist  Acetaminophen can cause liver damage if not taken correctly  Do not use more than 4 grams (4,000 milligrams) total of acetaminophen in one day  Prescription pain medicine  may be given  Ask your healthcare provider how to take this medicine safely  Some prescription pain medicines contain acetaminophen  Do not take other medicines that contain acetaminophen without talking to your healthcare provider  Too much acetaminophen may cause liver damage  Prescription pain medicine may cause constipation  Ask your healthcare provider how to prevent or treat constipation  Take your medicine as directed  Contact your healthcare provider if you think your medicine is not helping or if you have side effects  Tell him or her if you are allergic to any medicine  Keep a list of the medicines, vitamins, and herbs you take  Include the amounts, and when and why you take them  Bring the list or the pill bottles to follow-up visits  Carry your medicine list with you in case of an emergency  Follow up with your urologist or doctor as directed: You may need to return for more tests or procedures  Write down your questions so you remember to ask them during your visits  © Copyright [employer] 2021 Information is for End User's use only and may not be sold, redistributed or otherwise used for commercial purposes  All illustrations and images included in CareNotes® are the copyrighted property of [employer]  or [employer]  The above information is an  only   It is not intended as medical advice for individual conditions or treatments  Talk to your doctor, nurse or pharmacist before following any medical regimen to see if it is safe and effective for you

## 2022-09-13 NOTE — ANESTHESIA POSTPROCEDURE EVALUATION
Post-Op Assessment Note    CV Status:  Stable  Pain Score: 0    Pain management: adequate     Mental Status:  Arousable and sleepy   Hydration Status:  Stable   PONV Controlled:  Controlled   Airway Patency:  Patent      Post Op Vitals Reviewed: Yes      Staff: CRNA         No complications documented      BP   122/78   Temp 97 6   Pulse 77   Resp 20   SpO2 99

## 2022-09-14 ENCOUNTER — TELEPHONE (OUTPATIENT)
Dept: UROLOGY | Facility: HOSPITAL | Age: 62
End: 2022-09-14

## 2022-09-14 DIAGNOSIS — R97.20 ELEVATED PSA: Primary | ICD-10-CM

## 2022-09-14 PROCEDURE — G0416 PROSTATE BIOPSY, ANY MTHD: HCPCS | Performed by: PATHOLOGY

## 2022-09-14 NOTE — TELEPHONE ENCOUNTER
Scheduled patient to see Dr Green Blizzard 1/18/23 @ [address] [facility] office  Patient will need PSA prior to visit  Order is already in chart  Please confirm appt with patient

## 2022-09-14 NOTE — TELEPHONE ENCOUNTER
I called the patient let him know that his MRI guided fusion biopsy the prostate was negative  We will plan for repeat PSA and follow-up visit in January 2023

## 2022-09-15 NOTE — TELEPHONE ENCOUNTER
I called and LVM for patient to call back and confirm appointment  I also advised him to get PSA done 1 week prior to appointment

## 2022-11-04 ENCOUNTER — TELEPHONE (OUTPATIENT)
Dept: NEPHROLOGY | Facility: CLINIC | Age: 62
End: 2022-11-04

## 2022-12-22 ENCOUNTER — APPOINTMENT (OUTPATIENT)
Dept: LAB | Facility: HOSPITAL | Age: 62
End: 2022-12-22
Attending: FAMILY MEDICINE

## 2022-12-22 DIAGNOSIS — E83.51 HYPOCALCEMIA: ICD-10-CM

## 2022-12-22 DIAGNOSIS — R97.20 ELEVATED PSA: ICD-10-CM

## 2022-12-22 DIAGNOSIS — N28.9 ABNORMAL RENAL FUNCTION: ICD-10-CM

## 2022-12-22 DIAGNOSIS — N17.9 AKI (ACUTE KIDNEY INJURY) (HCC): ICD-10-CM

## 2022-12-22 DIAGNOSIS — R79.89 ABNORMAL CBC: ICD-10-CM

## 2022-12-22 LAB
ANION GAP SERPL CALCULATED.3IONS-SCNC: 8 MMOL/L (ref 4–13)
BASOPHILS # BLD MANUAL: 0 THOUSAND/UL (ref 0–0.1)
BASOPHILS NFR MAR MANUAL: 0 % (ref 0–1)
BUN SERPL-MCNC: 24 MG/DL (ref 5–25)
CA-I BLD-SCNC: 1.18 MMOL/L (ref 1.12–1.32)
CALCIUM SERPL-MCNC: 9 MG/DL (ref 8.4–10.2)
CHLORIDE SERPL-SCNC: 101 MMOL/L (ref 96–108)
CO2 SERPL-SCNC: 30 MMOL/L (ref 21–32)
CREAT SERPL-MCNC: 1.06 MG/DL (ref 0.6–1.3)
CREAT UR-MCNC: 160 MG/DL
EOSINOPHIL # BLD MANUAL: 0.56 THOUSAND/UL (ref 0–0.4)
EOSINOPHIL NFR BLD MANUAL: 8 % (ref 0–6)
ERYTHROCYTE [DISTWIDTH] IN BLOOD BY AUTOMATED COUNT: 13.8 % (ref 11.6–15.1)
GFR SERPL CREATININE-BSD FRML MDRD: 74 ML/MIN/1.73SQ M
GLUCOSE P FAST SERPL-MCNC: 101 MG/DL (ref 65–99)
HCT VFR BLD AUTO: 45.1 % (ref 36.5–49.3)
HGB BLD-MCNC: 14.9 G/DL (ref 12–17)
LYMPHOCYTES # BLD AUTO: 1.95 THOUSAND/UL (ref 0.6–4.47)
LYMPHOCYTES # BLD AUTO: 28 % (ref 14–44)
MCH RBC QN AUTO: 30.2 PG (ref 26.8–34.3)
MCHC RBC AUTO-ENTMCNC: 33 G/DL (ref 31.4–37.4)
MCV RBC AUTO: 91 FL (ref 82–98)
MONOCYTES # BLD AUTO: 0.49 THOUSAND/UL (ref 0–1.22)
MONOCYTES NFR BLD: 7 % (ref 4–12)
NEUTROPHILS # BLD MANUAL: 3.56 THOUSAND/UL (ref 1.85–7.62)
NEUTS BAND NFR BLD MANUAL: 2 % (ref 0–8)
NEUTS SEG NFR BLD AUTO: 49 % (ref 43–75)
PLATELET # BLD AUTO: 244 THOUSANDS/UL (ref 149–390)
PLATELET BLD QL SMEAR: ADEQUATE
PMV BLD AUTO: 11.8 FL (ref 8.9–12.7)
POTASSIUM SERPL-SCNC: 3.8 MMOL/L (ref 3.5–5.3)
PROT UR-MCNC: 24 MG/DL
PROT/CREAT UR: 0.15 MG/G{CREAT} (ref 0–0.1)
PSA SERPL-MCNC: 6.1 NG/ML (ref 0–4)
RBC # BLD AUTO: 4.94 MILLION/UL (ref 3.88–5.62)
RBC MORPH BLD: NORMAL
SODIUM SERPL-SCNC: 139 MMOL/L (ref 135–147)
VARIANT LYMPHS # BLD AUTO: 6 %
WBC # BLD AUTO: 6.98 THOUSAND/UL (ref 4.31–10.16)

## 2022-12-28 ENCOUNTER — TELEPHONE (OUTPATIENT)
Dept: NEPHROLOGY | Facility: CLINIC | Age: 62
End: 2022-12-28

## 2022-12-29 ENCOUNTER — OFFICE VISIT (OUTPATIENT)
Dept: NEPHROLOGY | Facility: CLINIC | Age: 62
End: 2022-12-29

## 2022-12-29 VITALS
BODY MASS INDEX: 28.17 KG/M2 | SYSTOLIC BLOOD PRESSURE: 132 MMHG | WEIGHT: 190.2 LBS | HEIGHT: 69 IN | DIASTOLIC BLOOD PRESSURE: 80 MMHG

## 2022-12-29 DIAGNOSIS — N17.9 AKI (ACUTE KIDNEY INJURY) (HCC): Primary | ICD-10-CM

## 2022-12-29 NOTE — PROGRESS NOTES
NEPHROLOGY OUTPATIENT PROGRESS NOTE   [facility] 58 y o  male MRN: [id number]  DATE: 1/4/2023    Reason for visit:   Chief Complaint   Patient presents with   • Follow-up     Abnormal kidney function        Patient Instructions   Thank you for coming to your visit today  As we discussed you kidney function is back to normal, your creatinine is 1mg/dL  Please follow the recommendations below       • Recommend low sodium (salt) food    • Avoid nonsteroidal anti-inflammatory drugs such as Naprosyn, ibuprofen, Aleve, Advil, Celebrex, Meloxicam (Mobic) etc   You can use Tylenol as needed if you do not have any liver condition to be concerned about    • Try to avoid medications such as pantoprazole or  Protonix/Nexium or Esomeprazole)/Prilosec or omeprazole/Prevacid or lansoprazole/AcipHex or Rabeprazole  If you are able to, use Pepcid as this is safer for your kidneys  • Try to exercise at least 30 minutes 3 days a week to begin with with an ultimate goal of 5 days a week for at least 30 minutes    You can continue follow up with your PCP       Zandra Joyce MD  Nephrology Attending           There are no diagnoses linked to this encounter  Assessment/Plan:  64 y o  male with PMH HTN  who presents for initial consultation for elevated creatinine   Patient is here for follow-up of MARYANN     PLAN:     #Non-Oliguric KDIGO MARYANN stage 1  with evidence of fully kidney recovery  • Etiology:hemodynamic changes in the settings of hypotension and pre-renal in the settings of viral infection in May 2022  • Peak creatinine: 1 5mg/dL  • Baseline creatinine: 1mg/dL 4/2022  • Current creatinine: 1 back to baseline    • UA:no hematuria, trace proteinuria   • Urine sediment: bland   • Renal imaging :no hydronephrosis, irregular bladder, BPH  • Avoid nephrotoxic agents such as NSAIDs  • Patient can follow-up by PCP        #Acid-base Disorder  • serum HCO3 49XTAC/L   • metabolic alkalosis likely secondary to poor fluid intake   • Enforce fluid intake      #Volume status/hypertension:  • Volume:euvolemic   • Blood pressure: Normotensive /80 mHg, gaol <140/90  • Recommend:  • Low sodium diet  • Continue Hyzaar for now, will hold if no improvement of MARYANN     #Anemia:  • Current hemoglobin:11 6mg/dL  • At goal      #BPH  • Followed by urology         SUBJECTIVE / INTERVAL HISTORY:  58 y o  male presents in follow up of MARYANN  Feels well, no shortness of breath, chest pain no recent hospitalizations    Joselyn Garcíaca denies any recent illness/hospitalizations/medication changes since last office visit  Review of Systems   Constitutional: Negative for activity change  HENT: Negative for congestion  Eyes: Negative for discharge  Respiratory: Negative for apnea and cough  Cardiovascular: Negative for chest pain and leg swelling  Gastrointestinal: Negative for abdominal distention and abdominal pain  Endocrine: Negative for cold intolerance  Genitourinary: Negative for dysuria  Musculoskeletal: Negative for arthralgias  Skin: Negative for color change and pallor  Neurological: Negative for dizziness  Psychiatric/Behavioral: Negative for agitation  OBJECTIVE:  /80 (BP Location: Right arm, Patient Position: Sitting, Cuff Size: Large)   Ht 5' 9" (1 753 m)   Wt 86 3 kg (190 lb 3 2 oz)   BMI 28 09 kg/m²  Body mass index is 28 09 kg/m²      Physical exam:  Physical Exam   General:  no acute distress at this time  Skin:  No acute rash  Eyes:  No scleral icterus and noninjected  ENT:  mucous membranes moist  Neck:  no carotid bruits  Chest:  Clear to auscultation percussion, good respiratory effort, no use of accessory respiratory muscles  CVS:  Regular rate and rhythm without rub ,   Abdomen:  soft and nontender   Extremities:  No significant lower extremity edema  Neuro:  No gross focality  Psych:  Alert , cooperative       Medications:    Current Outpatient Medications:   • losartan-hydrochlorothiazide (HYZAAR) 50-12 5 mg per tablet, Take 1 tablet by mouth daily, Disp: 90 tablet, Rfl: 3    Allergies: Allergies as of 12/29/2022   • (No Known Allergies)       The following portions of the patient's history were reviewed and updated as appropriate: past family history, past surgical history and problem list     Laboratory Results:  Lab Results   Component Value Date    SODIUM 139 12/22/2022    K 3 8 12/22/2022     12/22/2022    CO2 30 12/22/2022    BUN 24 12/22/2022    CREATININE 1 06 12/22/2022    CALCIUM 9 0 12/22/2022        Lab Results   Component Value Date    CALCIUM 9 0 12/22/2022       Portions of the record may have been created with voice recognition software  Occasional wrong word or "sound a like" substitutions may have occurred due to the inherent limitations of voice recognition software  Read the chart carefully and recognize, using context, where substitutions have occurred

## 2022-12-29 NOTE — PATIENT INSTRUCTIONS
Thank you for coming to your visit today  As we discussed you kidney function is back to normal, your creatinine is 1mg/dL  Please follow the recommendations below       Recommend low sodium (salt) food    Avoid nonsteroidal anti-inflammatory drugs such as Naprosyn, ibuprofen, Aleve, Advil, Celebrex, Meloxicam (Mobic) etc   You can use Tylenol as needed if you do not have any liver condition to be concerned about    Try to avoid medications such as pantoprazole or  Protonix/Nexium or Esomeprazole)/Prilosec or omeprazole/Prevacid or lansoprazole/AcipHex or Rabeprazole  If you are able to, use Pepcid as this is safer for your kidneys      Try to exercise at least 30 minutes 3 days a week to begin with with an ultimate goal of 5 days a week for at least 30 minutes    You can continue follow up with your PCP       Sivakumar Murillo MD  Nephrology Attending

## 2023-01-18 ENCOUNTER — OFFICE VISIT (OUTPATIENT)
Dept: UROLOGY | Facility: CLINIC | Age: 63
End: 2023-01-18

## 2023-01-18 VITALS
HEART RATE: 71 BPM | DIASTOLIC BLOOD PRESSURE: 78 MMHG | SYSTOLIC BLOOD PRESSURE: 132 MMHG | RESPIRATION RATE: 18 BRPM | HEIGHT: 69 IN | OXYGEN SATURATION: 98 % | BODY MASS INDEX: 28.29 KG/M2 | WEIGHT: 191 LBS

## 2023-01-18 DIAGNOSIS — R97.20 ELEVATED PSA: ICD-10-CM

## 2023-01-18 DIAGNOSIS — R39.12 WEAK URINARY STREAM: Primary | ICD-10-CM

## 2023-01-18 RX ORDER — TAMSULOSIN HYDROCHLORIDE 0.4 MG/1
0.4 CAPSULE ORAL
Qty: 90 CAPSULE | Refills: 3 | Status: SHIPPED | OUTPATIENT
Start: 2023-01-18 | End: 2023-04-18

## 2023-01-18 RX ORDER — ECHINACEA PURPUREA EXTRACT 125 MG
TABLET ORAL
COMMUNITY
Start: 2022-10-19

## 2023-01-18 NOTE — ASSESSMENT & PLAN NOTE
I described Flomax and the other alpha blockers  I explained the mechanism of action and also the potential side effects including retrograde ejaculation, lower blood pressure (which is usually temporary) and risk for floppy iris syndrome which could be a problem (That can be mitigated) if having cataract surgery  It usually takes 1-2 weeks to see an effect  We using dose once a day and in select patients consider going to b i d  dosing

## 2023-01-18 NOTE — ASSESSMENT & PLAN NOTE
Pt with persistently elevated PSA but negative biopsy (twice in 4 years) and PSA stable  Plan to repeat PSA in 9 months    Would have high threshold to repeat biopsy

## 2023-01-18 NOTE — PROGRESS NOTES
Assessment/Plan:    Elevated PSA  Pt with persistently elevated PSA but negative biopsy (twice in 4 years) and PSA stable  Plan to repeat PSA in 9 months  Would have high threshold to repeat biopsy    Weak urinary stream  I described Flomax and the other alpha blockers  I explained the mechanism of action and also the potential side effects including retrograde ejaculation, lower blood pressure (which is usually temporary) and risk for floppy iris syndrome which could be a problem (That can be mitigated) if having cataract surgery  It usually takes 1-2 weeks to see an effect  We using dose once a day and in select patients consider going to b i d  dosing  Subjective:      Patient ID: Martin Valera is a 58 y o  male  HPI    Martin Valera is a 64 y o  old with a history of elevated PSA      Patient previously followed with urology in [place] for elevated PSA   Patient was found to have an elevated PSA   He underwent a transrectal ultrasound-guided prostate biopsy 12/18/2019 which revealed active chronic inflammation without any findings of malignancy  Patient did have complications of post biopsy sepsis requiring hospitalization and IV antibiotics   Patient is very hesitant to have an another biopsy as a result of this      PSA trend:  11/25/21 7 27   4/5/222 6 01 with 12% free PSA   4/7/22: mpMRI showing PIRADS 4 lesion of the right posterior peripheral zone  Prostate glad 62g  Hypointense lesion of the right iliac bone incompletely evaluated    9/13/22 MRI fusion biopsy: negative  12/22/22 6  1      Patient has mild  lower urinary tract symptoms  He has weak stream at times and intermittent dysuria  Denies gross hematuria, or difficulty voiding   PVR at recent visit revealed complete bladder emptying     Medical comorbidities include HTN, prediabetes       9/6/22 ucx <10k mixed    Past Surgical History:   Procedure Laterality Date   • APPENDECTOMY     • COLONOSCOPY     • GA BX PROSTATE STRTCTC SATURATION SAMPLING IMG GID N/A 9/13/2022    Procedure: TRANSPERINEAL MRI FUSION  BIOPSY PROSTATE;  Surgeon: Jayashree Salter MD;  Location: [facility] MAIN OR;  Service: Urology        Past Medical History:   Diagnosis Date   • Hypertension    • Nasal congestion              Review of Systems   Constitutional: Negative for chills and fever  HENT: Negative for ear pain and sore throat  Eyes: Negative for pain and visual disturbance  Respiratory: Negative for cough and shortness of breath  Cardiovascular: Negative for chest pain and palpitations  Gastrointestinal: Negative for abdominal pain and vomiting  Genitourinary: Negative for dysuria and hematuria  Musculoskeletal: Negative for arthralgias and back pain  Skin: Negative for color change and rash  Neurological: Negative for seizures and syncope  All other systems reviewed and are negative  Objective:      /78 (BP Location: Right arm, Patient Position: Sitting, Cuff Size: Standard)   Pulse 71   Resp 18   Ht 5' 9" (1 753 m)   Wt 86 6 kg (191 lb)   SpO2 98%   BMI 28 21 kg/m²     Lab Results   Component Value Date    PSA 6 1 (H) 12/22/2022          Physical Exam  Vitals reviewed  Constitutional:       Appearance: Normal appearance  He is normal weight  HENT:      Head: Normocephalic and atraumatic  Eyes:      Pupils: Pupils are equal, round, and reactive to light  Abdominal:      General: Abdomen is flat  Neurological:      General: No focal deficit present  Mental Status: He is alert and oriented to person, place, and time  Psychiatric:         Mood and Affect: Mood normal          Thought Content:  Thought content normal            Orders  Orders Placed This Encounter   Procedures   • PSA Total, Diagnostic     Standing Status:   Future     Standing Expiration Date:   1/18/2024

## 2023-03-16 ENCOUNTER — OFFICE VISIT (OUTPATIENT)
Dept: FAMILY MEDICINE CLINIC | Facility: CLINIC | Age: 63
End: 2023-03-16

## 2023-03-16 VITALS
HEIGHT: 69 IN | BODY MASS INDEX: 28.73 KG/M2 | SYSTOLIC BLOOD PRESSURE: 136 MMHG | RESPIRATION RATE: 18 BRPM | DIASTOLIC BLOOD PRESSURE: 84 MMHG | OXYGEN SATURATION: 98 % | WEIGHT: 194 LBS | TEMPERATURE: 97.5 F | HEART RATE: 68 BPM

## 2023-03-16 DIAGNOSIS — I10 PRIMARY HYPERTENSION: Primary | ICD-10-CM

## 2023-03-16 DIAGNOSIS — Z00.00 ANNUAL PHYSICAL EXAM: ICD-10-CM

## 2023-03-16 DIAGNOSIS — R97.20 ELEVATED PSA: ICD-10-CM

## 2023-03-16 PROBLEM — E83.51 HYPOCALCEMIA: Status: RESOLVED | Noted: 2022-05-19 | Resolved: 2023-03-16

## 2023-03-16 PROBLEM — R39.12 WEAK URINARY STREAM: Status: RESOLVED | Noted: 2023-01-18 | Resolved: 2023-03-16

## 2023-03-16 PROBLEM — R79.89 ABNORMAL CBC: Status: RESOLVED | Noted: 2022-05-12 | Resolved: 2023-03-16

## 2023-03-16 PROBLEM — R73.09 ABNORMAL GLUCOSE: Status: RESOLVED | Noted: 2021-12-15 | Resolved: 2023-03-16

## 2023-03-16 PROBLEM — R50.9 FEBRILE ILLNESS: Status: RESOLVED | Noted: 2022-05-12 | Resolved: 2023-03-16

## 2023-03-16 NOTE — PROGRESS NOTES
Name: Valentino Center      : 1960      MRN: [id number]  Encounter Provider: Henok Anand MD  Encounter Date: 3/16/2023   Encounter department: [address] [place] [address] MEDICINE    Assessment & Plan     1  Primary hypertension  Assessment & Plan:  Ok on meds      2  Annual physical exam  Assessment & Plan:  Labs up to date      3  Elevated PSA  Assessment & Plan:  Reviewed urology note  Biopsy negative             Subjective      HPI Patient here for annual physical interval visit  HTN    Review of Systems   Constitutional: Negative for appetite change, chills, fatigue and fever  Respiratory: Negative for cough, chest tightness and shortness of breath  Cardiovascular: Negative for chest pain, palpitations and leg swelling  Gastrointestinal: Negative for abdominal pain, constipation, diarrhea, nausea and vomiting  Genitourinary: Negative for difficulty urinating and frequency  Musculoskeletal: Negative for arthralgias, back pain, gait problem and neck pain  Skin: Negative for rash  Neurological: Negative for dizziness, weakness, light-headedness, numbness and headaches  Hematological: Does not bruise/bleed easily  Psychiatric/Behavioral: Negative for dysphoric mood and sleep disturbance  The patient is not nervous/anxious          Current Outpatient Medications on File Prior to Visit   Medication Sig   • losartan-hydrochlorothiazide (HYZAAR) 50-12 5 mg per tablet Take 1 tablet by mouth daily   • [DISCONTINUED] sodium chloride (OCEAN) 0 65 % nasal spray INSTILL TWO SPRAYS IN BOTH NOSTRILS AT BEDTIME AS DIRECTED (Patient not taking: Reported on 3/16/2023)   • [DISCONTINUED] tamsulosin (FLOMAX) 0 4 mg Take 1 capsule (0 4 mg total) by mouth daily at bedtime (Patient not taking: Reported on 3/16/2023)       Objective     /84   Pulse 68   Temp 97 5 °F (36 4 °C) (Tympanic)   Resp 18   Ht 5' 9" (1 753 m)   Wt 88 kg (194 lb)   SpO2 98%   BMI 28 65 kg/m²     Physical Exam  Vitals reviewed  Constitutional:       General: He is not in acute distress  Appearance: Normal appearance  He is not ill-appearing  HENT:      Right Ear: Tympanic membrane, ear canal and external ear normal       Left Ear: Tympanic membrane, ear canal and external ear normal       Nose: Nose normal    Eyes:      General: Lids are normal       Extraocular Movements: Extraocular movements intact  Conjunctiva/sclera: Conjunctivae normal       Pupils: Pupils are equal, round, and reactive to light  Neck:      Thyroid: No thyromegaly  Vascular: No carotid bruit  Cardiovascular:      Rate and Rhythm: Normal rate and regular rhythm  Pulses: Normal pulses  Heart sounds: Normal heart sounds  Pulmonary:      Effort: Pulmonary effort is normal       Breath sounds: Normal breath sounds  Chest:   Breasts:     Right: Normal       Left: Normal    Abdominal:      General: Bowel sounds are normal  There is no distension  Palpations: Abdomen is soft  There is no mass  Tenderness: There is no abdominal tenderness  Hernia: No hernia is present  Musculoskeletal:         General: Normal range of motion  Cervical back: Full passive range of motion without pain, normal range of motion and neck supple  Right lower leg: No edema  Left lower leg: No edema  Lymphadenopathy:      Cervical: No cervical adenopathy  Skin:     General: Skin is warm and dry  Comments: No abn moles   Neurological:      General: No focal deficit present  Mental Status: He is alert and oriented to person, place, and time  Mental status is at baseline  Cranial Nerves: No cranial nerve deficit  Sensory: No sensory deficit  Motor: No weakness or tremor        Coordination: Coordination normal       Gait: Gait normal       Deep Tendon Reflexes: Reflexes normal    Psychiatric:         Mood and Affect: Mood normal          Speech: Speech normal          Behavior: Behavior normal      BMI Counseling: Body mass index is 28 65 kg/m²  The BMI is above normal  Nutrition recommendations include 3-5 servings of fruits/vegetables daily, reducing fast food intake, consuming healthier snacks, decreasing soda and/or juice intake, moderation in carbohydrate intake and increasing intake of lean protein  Exercise recommendations include exercising 3-5 times per week and strength training exercises    Koko Dupree MD

## 2023-06-17 DIAGNOSIS — I10 PRIMARY HYPERTENSION: ICD-10-CM

## 2023-06-19 RX ORDER — LOSARTAN POTASSIUM AND HYDROCHLOROTHIAZIDE 12.5; 5 MG/1; MG/1
TABLET ORAL
Qty: 90 TABLET | Refills: 3 | Status: SHIPPED | OUTPATIENT
Start: 2023-06-19

## 2023-07-19 ENCOUNTER — PATIENT MESSAGE (OUTPATIENT)
Dept: UROLOGY | Facility: CLINIC | Age: 63
End: 2023-07-19

## 2023-07-19 DIAGNOSIS — R39.12 WEAK URINARY STREAM: Primary | ICD-10-CM

## 2023-07-19 RX ORDER — TAMSULOSIN HYDROCHLORIDE 0.4 MG/1
0.4 CAPSULE ORAL
Qty: 90 CAPSULE | Refills: 3 | Status: SHIPPED | OUTPATIENT
Start: 2023-07-19

## 2023-07-19 RX ORDER — TAMSULOSIN HYDROCHLORIDE 0.4 MG/1
0.4 CAPSULE ORAL
OUTPATIENT
Start: 2023-07-19

## 2024-01-16 ENCOUNTER — TELEPHONE (OUTPATIENT)
Age: 64
End: 2024-01-16

## 2024-01-16 NOTE — TELEPHONE ENCOUNTER
Caller: [facility]    Doctor/Office: Dr. Anderws/Seth    #: [phone] ext [phone]    Escalation: Appointment Has the office received the paperwork for this patient? Please return call to [facility]. Thank you

## 2024-01-17 ENCOUNTER — TELEPHONE (OUTPATIENT)
Dept: OBGYN CLINIC | Facility: CLINIC | Age: 64
End: 2024-01-17

## 2024-01-17 NOTE — TELEPHONE ENCOUNTER
Called and LVM for pt to cb to sched an appt with Dr. Andrews. Let pt know we received a [facility] fax to sched. Included our phone # for pt to cb to sched.     [facility] Ref #[id number]

## 2024-01-17 NOTE — TELEPHONE ENCOUNTER
Called and LVM for [facility]. We did not receive any fax from [facility] regarding this pt. Included our fax # to the [facility] can fax this info to us.

## 2024-02-07 ENCOUNTER — HOSPITAL ENCOUNTER (OUTPATIENT)
Dept: RADIOLOGY | Facility: HOSPITAL | Age: 64
Discharge: HOME | End: 2024-02-07
Attending: PODIATRIST
Payer: COMMERCIAL

## 2024-02-07 ENCOUNTER — OFFICE VISIT (OUTPATIENT)
Dept: PODIATRY | Facility: CLINIC | Age: 64
End: 2024-02-07
Payer: COMMERCIAL

## 2024-02-07 VITALS
BODY MASS INDEX: 28.14 KG/M2 | HEIGHT: 69 IN | OXYGEN SATURATION: 96 % | DIASTOLIC BLOOD PRESSURE: 90 MMHG | WEIGHT: 190 LBS | HEART RATE: 57 BPM | SYSTOLIC BLOOD PRESSURE: 133 MMHG

## 2024-02-07 DIAGNOSIS — R52 PAIN: Primary | ICD-10-CM

## 2024-02-07 DIAGNOSIS — R52 PAIN: ICD-10-CM

## 2024-02-07 DIAGNOSIS — M20.21 HALLUX RIGIDUS OF RIGHT FOOT: ICD-10-CM

## 2024-02-07 DIAGNOSIS — M19.072 DJD (DEGENERATIVE JOINT DISEASE), ANKLE AND FOOT, LEFT: ICD-10-CM

## 2024-02-07 PROCEDURE — 73630 X-RAY EXAM OF FOOT: CPT

## 2024-02-07 PROCEDURE — 99203 OFFICE O/P NEW LOW 30 MIN: CPT | Performed by: PODIATRIST

## 2024-02-07 NOTE — PROGRESS NOTES
Assessment/Plan:     The patient's clinical examination today significant for mild to moderate tenderness palpation along the anterior aspect of the left ankle as well as the posterior aspect of the left hindfoot.  There is no erythema nor edema no counter ecchymosis noted.  There is decreased passive and active range of motion of the left ankle and hindfoot joints.  On the right foot.  There is relatively mild tenderness with palpation and passive range of motion of the right ankle joint.  There is moderate tenderness with palpation of the right first metatarsophalangeal joint.  There is marked reduction in passive range of motion of the right first MTPJ.  Pedal pulses are palpable bilaterally.  Capillary refill time to the toes is within normal limits.    X-rays of both feet taken today were personally viewed interpreted.  Findings were significant for moderate to severe arthritic changes of the left ankle joint especially in the anterior aspect.  Significant arthritis is also noted to the posterior facet of the subtalar joint.  On the right foot, there is mild arthritis of the anterior ankle joint.  There is moderate to severe arthritis of the right first metatarsophalangeal joint.    X-rays were reviewed with the patient in detail.  Treatment options were also discussed with the patient.  This includes physical therapy, injection therapy, NSAID therapy, custom orthoses or braces, and finally surgical management by means of arthrodesis or joint replacement surgery.  The patient is not interested in surgical invention at this point in time.  He would like to continue to monitor his condition.  He defers prescription anti-inflammatories and would prefer OTC NSAIDs on as-needed basis.  Custom bracing is an option, however he would rather try an over-the-counter product for now.  I did make recommendations for a Tri-Lock ankle brace specially on the left lower extremity.  For his right foot, would recommend stiffer  soled shoes or carbon fiber inserts to reduce motion through his right great toe joint.  We can reconsider injection therapy if he is to have any acute exacerbations that are not alleviated with OTC medications.    The patient will follow-up with me on an as-needed basis.     Diagnoses and all orders for this visit:    Pain  -     X-ray foot left 3+ views; Future  -     Cancel: X-ray foot right 3+ views; Future  -     X-ray foot right 3+ views; Future    DJD (degenerative joint disease), ankle and foot, left    Hallux rigidus of right foot          Subjective:     Patient ID: Felix Valenzuela is a 63 y.o. male.    Patient presents today for his initial consultation with [facility] podiatry group with a chief complaint of bilateral foot pain that has been present for many years.  He feels that the pain is getting worse, the left ankle essentially hurts on a daily basis.  The right foot pain comes and goes, noting occasionally that it feels like it is locking up on him.  The patient does note prior history of injury to both feet in his  days, with the left foot and ankle sustaining multiple traumas.  Tenderness now is typically worse with prolonged periods of ambulation and weightbearing.  He does note stiffness to both feet when getting up in the morning.  Tenderness is typically alleviated by getting off his feet and resting.  There has been no recent injury or trauma to either lower extremity.      PAST MEDICAL HISTORY:  Past Medical History:   Diagnosis Date    Hypertension     Nasal congestion        PAST SURGICAL HISTORY:  Past Surgical History:   Procedure Laterality Date    APPENDECTOMY      COLONOSCOPY      WA BX PROSTATE STRTCTC SATURATION SAMPLING IMG GID N/A 9/13/2022    Procedure: TRANSPERINEAL MRI FUSION  BIOPSY PROSTATE;  Surgeon: Artem Conner MD;  Location: [facility] MAIN OR;  Service: Urology        ALLERGIES:  Patient has no known allergies.    MEDICATIONS:  Current Outpatient Medications    Medication Sig Dispense Refill    losartan-hydrochlorothiazide (HYZAAR) 50-12.5 mg per tablet TAKE 1 TABLET BY MOUTH EVERY DAY 90 tablet 3    tamsulosin (FLOMAX) 0.4 mg Take 1 capsule (0.4 mg total) by mouth daily with dinner (Patient not taking: Reported on 2/7/2024) 90 capsule 3     No current facility-administered medications for this visit.       SOCIAL HISTORY:  Social History     Socioeconomic History    Marital status: /Civil Union     Spouse name: None    Number of children: None    Years of education: None    Highest education level: None   Occupational History    None   Tobacco Use    Smoking status: Never    Smokeless tobacco: Never   Vaping Use    Vaping status: Never Used   Substance and Sexual Activity    Alcohol use: Yes     Alcohol/week: 2.0 standard drinks of alcohol     Types: 2 Glasses of wine per week     Comment: occ    Drug use: Never    Sexual activity: Not Currently   Other Topics Concern    None   Social History Narrative    None     Social Determinants of Health     Financial Resource Strain: Not on file   Food Insecurity: Not on file   Transportation Needs: Not on file   Physical Activity: Not on file   Stress: Not on file   Social Connections: Not on file   Intimate Partner Violence: Not on file   Housing Stability: Not on file        Review of Systems   Constitutional: Negative.    HENT: Negative.     Eyes: Negative.    Respiratory: Negative.     Cardiovascular: Negative.    Endocrine: Negative.    Musculoskeletal: Negative.    Neurological: Negative.    Hematological: Negative.    Psychiatric/Behavioral: Negative.           Objective:     Physical Exam  Vitals reviewed.   Constitutional:       Appearance: Normal appearance.   HENT:      Head: Normocephalic and atraumatic.      Nose: Nose normal.   Eyes:      Conjunctiva/sclera: Conjunctivae normal.      Pupils: Pupils are equal, round, and reactive to light.   Cardiovascular:      Pulses:           Dorsalis pedis pulses are 2+  on the right side and 2+ on the left side.        Posterior tibial pulses are 2+ on the right side and 2+ on the left side.   Pulmonary:      Effort: Pulmonary effort is normal.   Feet:      Comments: The patient's clinical examination today significant for mild to moderate tenderness palpation along the anterior aspect of the left ankle as well as the posterior aspect of the left hindfoot.  There is no erythema nor edema no counter ecchymosis noted.  There is decreased passive and active range of motion of the left ankle and hindfoot joints.  On the right foot.  There is relatively mild tenderness with palpation and passive range of motion of the right ankle joint.  There is moderate tenderness with palpation of the right first metatarsophalangeal joint.  There is marked reduction in passive range of motion of the right first MTPJ.  Pedal pulses are palpable bilaterally.  Refill time to the toes is within normal limits.    Skin:     General: Skin is warm.      Capillary Refill: Capillary refill takes less than 2 seconds.   Neurological:      General: No focal deficit present.      Mental Status: He is alert and oriented to person, place, and time.   Psychiatric:         Mood and Affect: Mood normal.         Behavior: Behavior normal.         Thought Content: Thought content normal.

## 2024-03-29 PROBLEM — N28.9 ABNORMAL RENAL FUNCTION: Status: RESOLVED | Noted: 2022-05-12 | Resolved: 2024-03-29

## 2024-05-24 DIAGNOSIS — I10 PRIMARY HYPERTENSION: ICD-10-CM

## 2024-05-24 RX ORDER — LOSARTAN POTASSIUM AND HYDROCHLOROTHIAZIDE 12.5; 5 MG/1; MG/1
TABLET ORAL
Qty: 30 TABLET | Refills: 0 | Status: SHIPPED | OUTPATIENT
Start: 2024-05-24

## 2024-06-07 ENCOUNTER — TELEPHONE (OUTPATIENT)
Dept: UROLOGY | Facility: CLINIC | Age: 64
End: 2024-06-07

## 2024-06-07 DIAGNOSIS — R97.20 ELEVATED PSA: Primary | ICD-10-CM

## 2024-06-07 NOTE — TELEPHONE ENCOUNTER
Dr Conner doesn't start until 830am on 6/28.     Please r/s patient for folow up with an AP per previous check out notes, he is overdue. He also needs a PSA prior. Order placed.

## 2024-06-24 ENCOUNTER — TELEPHONE (OUTPATIENT)
Dept: UROLOGY | Facility: CLINIC | Age: 64
End: 2024-06-24

## 2024-06-24 NOTE — TELEPHONE ENCOUNTER
Patient returned missed call and says that he plans to have PSA drawn on Friday 6/28.    Pt also confirmed 7/2 3 PM appointment in [place].    No further action is needed at this time.

## 2024-06-28 ENCOUNTER — APPOINTMENT (OUTPATIENT)
Dept: LAB | Facility: HOSPITAL | Age: 64
End: 2024-06-28
Payer: COMMERCIAL

## 2024-06-28 DIAGNOSIS — R97.20 ELEVATED PSA: ICD-10-CM

## 2024-06-28 LAB — PSA SERPL-MCNC: 8.36 NG/ML (ref 0–4)

## 2024-06-28 PROCEDURE — 36415 COLL VENOUS BLD VENIPUNCTURE: CPT

## 2024-06-28 PROCEDURE — 84153 ASSAY OF PSA TOTAL: CPT

## 2024-07-02 NOTE — TELEPHONE ENCOUNTER
Pt called to reschedule the 7/3 appointment, he stated it will not work for his schedule. AP's booking into September, pt was scheduled for the next available of 9/18 and added to wait list.

## 2024-07-06 DIAGNOSIS — I10 PRIMARY HYPERTENSION: ICD-10-CM

## 2024-07-08 RX ORDER — LOSARTAN POTASSIUM AND HYDROCHLOROTHIAZIDE 12.5; 5 MG/1; MG/1
1 TABLET ORAL DAILY
Qty: 30 TABLET | Refills: 0 | Status: SHIPPED | OUTPATIENT
Start: 2024-07-08 | End: 2024-07-19 | Stop reason: SDUPTHER

## 2024-07-19 ENCOUNTER — TELEPHONE (OUTPATIENT)
Dept: UROLOGY | Facility: CLINIC | Age: 64
End: 2024-07-19

## 2024-07-19 ENCOUNTER — OFFICE VISIT (OUTPATIENT)
Dept: FAMILY MEDICINE CLINIC | Facility: CLINIC | Age: 64
End: 2024-07-19
Payer: COMMERCIAL

## 2024-07-19 VITALS
DIASTOLIC BLOOD PRESSURE: 66 MMHG | BODY MASS INDEX: 27.4 KG/M2 | WEIGHT: 185 LBS | HEIGHT: 69 IN | TEMPERATURE: 98.3 F | RESPIRATION RATE: 16 BRPM | OXYGEN SATURATION: 97 % | HEART RATE: 61 BPM | SYSTOLIC BLOOD PRESSURE: 128 MMHG

## 2024-07-19 DIAGNOSIS — E55.9 VITAMIN D DEFICIENCY: ICD-10-CM

## 2024-07-19 DIAGNOSIS — R97.20 ELEVATED PSA: Primary | ICD-10-CM

## 2024-07-19 DIAGNOSIS — R97.20 ELEVATED PSA: ICD-10-CM

## 2024-07-19 DIAGNOSIS — R73.03 PREDIABETES: ICD-10-CM

## 2024-07-19 DIAGNOSIS — I10 PRIMARY HYPERTENSION: Primary | ICD-10-CM

## 2024-07-19 DIAGNOSIS — Z00.00 ANNUAL PHYSICAL EXAM: ICD-10-CM

## 2024-07-19 PROCEDURE — 99214 OFFICE O/P EST MOD 30 MIN: CPT | Performed by: FAMILY MEDICINE

## 2024-07-19 PROCEDURE — 99396 PREV VISIT EST AGE 40-64: CPT | Performed by: FAMILY MEDICINE

## 2024-07-19 RX ORDER — LOSARTAN POTASSIUM AND HYDROCHLOROTHIAZIDE 12.5; 5 MG/1; MG/1
1 TABLET ORAL DAILY
Qty: 90 TABLET | Refills: 2 | Status: SHIPPED | OUTPATIENT
Start: 2024-07-19

## 2024-07-19 NOTE — TELEPHONE ENCOUNTER
I called the patient left a voicemail.  His PSA is higher than before at 8.3 but he has been elevated chronically for years the general range of 6.5-7.3.    I think the most appropriate next step is to obtain an MRI of the prostate.  I said in the voicemail that I will place an order for this and ask our team to help him arrange.

## 2024-07-19 NOTE — PROGRESS NOTES
Subjective: Patient here for annual physical Pt is here for interval visit and evaluation of multiple medical problems, review of medications, labs, Health Maintenance and any recent specialty consults         Patient ID: Felix Valenzuela is a 63 y.o. male.    HPI    Past Medical History:   Diagnosis Date   • Hypertension    • Nasal congestion        Family History   Problem Relation Age of Onset   • Heart disease Mother    • Heart disease Father    • Hypertension Father    • Diabetes Brother    • No Known Problems Brother    • No Known Problems Brother    • No Known Problems Brother    • No Known Problems Brother    • No Known Problems Maternal Grandmother    • No Known Problems Maternal Grandfather    • No Known Problems Paternal Grandmother    • No Known Problems Paternal Grandfather        Past Surgical History:   Procedure Laterality Date   • APPENDECTOMY     • COLONOSCOPY     • [place] BX PROSTATE STRTCTC SATURATION SAMPLING IMG GID N/A 9/13/2022    Procedure: TRANSPERINEAL MRI FUSION  BIOPSY PROSTATE;  Surgeon: Artem Conner MD;  Location: [facility];  Service: Urology        reports that he has never smoked. He has never used smokeless tobacco. He reports current alcohol use of about 2.0 standard drinks of alcohol per week. He reports that he does not use drugs.      Current Outpatient Medications:   •  losartan-hydrochlorothiazide (HYZAAR) 50-12.5 mg per tablet, Take 1 tablet by mouth daily, Disp: 90 tablet, Rfl: 2    The following portions of the patient's history were reviewed and updated as appropriate: allergies, current medications, past family history, past medical history, past social history, past surgical history and problem list.    Review of Systems   Constitutional:  Negative for appetite change, chills, fatigue and fever.   Respiratory:  Negative for cough, chest tightness and shortness of breath.    Cardiovascular:  Negative for chest pain, palpitations and leg swelling.   Gastrointestinal:  " Negative for abdominal pain, constipation, diarrhea, nausea and vomiting.   Genitourinary:  Negative for difficulty urinating and frequency.   Musculoskeletal:  Negative for arthralgias, back pain, gait problem and neck pain.   Skin:  Negative for rash.   Neurological:  Negative for dizziness, weakness, light-headedness, numbness and headaches.   Hematological:  Does not bruise/bleed easily.   Psychiatric/Behavioral:  Negative for dysphoric mood and sleep disturbance. The patient is not nervous/anxious.          PHQ-2/9 Depression Screening    Little interest or pleasure in doing things: 1 - several days  Feeling down, depressed, or hopeless: 1 - several days  PHQ-2 Score: 2  PHQ-2 Interpretation: Negative depression screen             Objective:    /66   Pulse 61   Temp 98.3 °F (36.8 °C) (Tympanic)   Resp 16   Ht 5' 9\" (1.753 m)   Wt 83.9 kg (185 lb)   SpO2 97%   BMI 27.32 kg/m²      Physical Exam  Vitals reviewed.   Constitutional:       General: He is not in acute distress.     Appearance: Normal appearance. He is not ill-appearing.   HENT:      Right Ear: Tympanic membrane, ear canal and external ear normal.      Left Ear: Tympanic membrane, ear canal and external ear normal.      Nose: Nose normal.   Eyes:      General: Lids are normal.      Extraocular Movements: Extraocular movements intact.      Conjunctiva/sclera: Conjunctivae normal.      Pupils: Pupils are equal, round, and reactive to light.   Neck:      Thyroid: No thyromegaly.      Vascular: No carotid bruit.   Cardiovascular:      Rate and Rhythm: Normal rate and regular rhythm.      Pulses: Normal pulses.      Heart sounds: Normal heart sounds.   Pulmonary:      Effort: Pulmonary effort is normal.      Breath sounds: Normal breath sounds.   Chest:   Breasts:     Right: Normal.      Left: Normal.   Abdominal:      General: Bowel sounds are normal. There is no distension.      Palpations: Abdomen is soft. There is no mass.      " Tenderness: There is no abdominal tenderness.      Hernia: No hernia is present.   Musculoskeletal:         General: Normal range of motion.      Cervical back: Full passive range of motion without pain, normal range of motion and neck supple.      Right lower leg: No edema.      Left lower leg: No edema.   Lymphadenopathy:      Cervical: No cervical adenopathy.   Skin:     General: Skin is warm and dry.      Comments: No abn moles   Neurological:      General: No focal deficit present.      Mental Status: He is alert and oriented to person, place, and time. Mental status is at baseline.      Cranial Nerves: No cranial nerve deficit.      Sensory: No sensory deficit.      Motor: No weakness or tremor.      Coordination: Coordination normal.      Gait: Gait normal.      Deep Tendon Reflexes: Reflexes normal.   Psychiatric:         Mood and Affect: Mood normal.         Speech: Speech normal.         Behavior: Behavior normal.           Recent Results (from the past 8736 hour(s))   PSA Total, Diagnostic    Collection Time: 06/28/24  7:35 AM   Result Value Ref Range    PSA, Diagnostic 8.361 (H) 0.000 - 4.000 ng/mL       Laboratory Results: I have personally reviewed the pertinent laboratory results/reports     Radiology/Other Diagnostic Testing Results: I have personally reviewed pertinent reports.      X-ray foot left 3+ views    Result Date: 2/7/2024  LEFT FOOT INDICATION:   Pain, unspecified. COMPARISON:  There are no previous examinations available for comparison. VIEWS:  XR FOOT 3+ VW LEFT FINDINGS: There is no acute fracture or dislocation. Mild osteoarthritis. Small plantar and dorsal calcaneal spurs. No lytic or blastic osseous lesion. Soft tissues are unremarkable.     No acute osseous abnormality. Degenerative changes as described. Electronically signed: 02/07/2024 08:41 PM Fransico Coronado MD    X-ray foot right 3+ views    Result Date: 2/7/2024  RIGHT FOOT INDICATION:   Pain, unspecified.  COMPARISON:  There  "are no previous examinations available for comparison. VIEWS:  XR FOOT 3+ VW RIGHT Images: 3 FINDINGS: There is no acute fracture or dislocation. Severe hallux MTP osteoarthritis. Small calcaneal spurs. No lytic or blastic osseous lesion. Soft tissues are unremarkable.     No acute osseous abnormality. Degenerative changes as described. Electronically signed: 02/07/2024 01:25 PM Eduardo Trotter, MPH,MD       Assessment/Plan:  1. Primary hypertension  -     Comprehensive metabolic panel; Future; Expected date: 07/19/2024  -     Urinalysis with microscopic  -     losartan-hydrochlorothiazide (HYZAAR) 50-12.5 mg per tablet; Take 1 tablet by mouth daily  2. Prediabetes  Assessment & Plan:  Check hga1c    Orders:  -     Hemoglobin A1C; Future; Expected date: 07/19/2024  3. Annual physical exam  Assessment & Plan:  Check routine labs    Orders:  -     CBC and differential; Future  -     Comprehensive metabolic panel; Future; Expected date: 07/19/2024  -     Lipid panel; Future; Expected date: 07/19/2024  -     TSH, 3rd generation; Future  4. Elevated PSA  Assessment & Plan:  Recent PSA even higher pt has had 2 biopsies in past as reviewed urology note pt needs to see urologist  has appt 9/24  5. Vitamin D deficiency  Assessment & Plan:  Check level   Orders:  -     Vitamin D 25 hydroxy; Future                   Read package inserts for all medications before starting a new medications, call me if you have any questions.    Patient was given opportunity to ask questions and all questions were answered.    Disclaimer: Portions of the record may have been created with voice recognition software. Occasional wrong word or \"sound a like\" substitutions may have occurred due to the inherent limitations of voice recognition software. Read the chart carefully and recognize, using context, where substitutions have occurred. I have used the Epic copy/forward function to compose this note. I have reviewed my current note to ensure it " reflects the current patient status, exam, assessment and plan.

## 2024-07-19 NOTE — ASSESSMENT & PLAN NOTE
Recent PSA even higher pt has had 2 biopsies in past as reviewed urology note pt needs to see urologist  has appt 9/24

## 2024-07-22 NOTE — TELEPHONE ENCOUNTER
Voicemail left for the patient's wife, David, using [employer] [phone] asking to please call the office to schedule patient's MRI of the prostate.    [employer] stated when she called the patient the phone did not ring.

## 2024-07-23 NOTE — TELEPHONE ENCOUNTER
JULIA in Maldivian to contact office. If he calls back, please advise to schedule MRI prostate as recommended by Dr. Conner.

## 2024-07-23 NOTE — TELEPHONE ENCOUNTER
Patient calling in and stated he will schedule MRI. Was transferred to central scheduling. I offered phone number he was driving and declined.

## 2024-07-27 ENCOUNTER — APPOINTMENT (OUTPATIENT)
Dept: LAB | Facility: CLINIC | Age: 64
End: 2024-07-27
Payer: COMMERCIAL

## 2024-07-27 DIAGNOSIS — Z00.00 ANNUAL PHYSICAL EXAM: ICD-10-CM

## 2024-07-27 DIAGNOSIS — R73.03 PREDIABETES: ICD-10-CM

## 2024-07-27 DIAGNOSIS — E55.9 VITAMIN D DEFICIENCY: ICD-10-CM

## 2024-07-27 DIAGNOSIS — I10 PRIMARY HYPERTENSION: ICD-10-CM

## 2024-07-27 LAB
25(OH)D3 SERPL-MCNC: 58 NG/ML (ref 30–100)
ALBUMIN SERPL BCG-MCNC: 4.1 G/DL (ref 3.5–5)
ALP SERPL-CCNC: 79 U/L (ref 34–104)
ALT SERPL W P-5'-P-CCNC: 18 U/L (ref 7–52)
ANION GAP SERPL CALCULATED.3IONS-SCNC: 5 MMOL/L (ref 4–13)
AST SERPL W P-5'-P-CCNC: 18 U/L (ref 13–39)
BACTERIA UR QL AUTO: ABNORMAL /HPF
BASOPHILS # BLD AUTO: 0.05 THOUSANDS/ÂΜL (ref 0–0.1)
BASOPHILS NFR BLD AUTO: 1 % (ref 0–1)
BILIRUB SERPL-MCNC: 0.82 MG/DL (ref 0.2–1)
BILIRUB UR QL STRIP: NEGATIVE
BUN SERPL-MCNC: 21 MG/DL (ref 5–25)
CALCIUM SERPL-MCNC: 9.1 MG/DL (ref 8.4–10.2)
CHLORIDE SERPL-SCNC: 102 MMOL/L (ref 96–108)
CHOLEST SERPL-MCNC: 157 MG/DL
CLARITY UR: CLEAR
CO2 SERPL-SCNC: 30 MMOL/L (ref 21–32)
COLOR UR: ABNORMAL
CREAT SERPL-MCNC: 1.08 MG/DL (ref 0.6–1.3)
EOSINOPHIL # BLD AUTO: 0.39 THOUSAND/ÂΜL (ref 0–0.61)
EOSINOPHIL NFR BLD AUTO: 5 % (ref 0–6)
ERYTHROCYTE [DISTWIDTH] IN BLOOD BY AUTOMATED COUNT: 13.4 % (ref 11.6–15.1)
EST. AVERAGE GLUCOSE BLD GHB EST-MCNC: 123 MG/DL
GFR SERPL CREATININE-BSD FRML MDRD: 72 ML/MIN/1.73SQ M
GLUCOSE P FAST SERPL-MCNC: 103 MG/DL (ref 65–99)
GLUCOSE UR STRIP-MCNC: NEGATIVE MG/DL
HBA1C MFR BLD: 5.9 %
HCT VFR BLD AUTO: 47.4 % (ref 36.5–49.3)
HDLC SERPL-MCNC: 46 MG/DL
HGB BLD-MCNC: 15.4 G/DL (ref 12–17)
HGB UR QL STRIP.AUTO: NEGATIVE
IMM GRANULOCYTES # BLD AUTO: 0.03 THOUSAND/UL (ref 0–0.2)
IMM GRANULOCYTES NFR BLD AUTO: 0 % (ref 0–2)
KETONES UR STRIP-MCNC: NEGATIVE MG/DL
LDLC SERPL CALC-MCNC: 86 MG/DL (ref 0–100)
LEUKOCYTE ESTERASE UR QL STRIP: ABNORMAL
LYMPHOCYTES # BLD AUTO: 2.77 THOUSANDS/ÂΜL (ref 0.6–4.47)
LYMPHOCYTES NFR BLD AUTO: 34 % (ref 14–44)
MCH RBC QN AUTO: 29.2 PG (ref 26.8–34.3)
MCHC RBC AUTO-ENTMCNC: 32.5 G/DL (ref 31.4–37.4)
MCV RBC AUTO: 90 FL (ref 82–98)
MONOCYTES # BLD AUTO: 0.61 THOUSAND/ÂΜL (ref 0.17–1.22)
MONOCYTES NFR BLD AUTO: 8 % (ref 4–12)
NEUTROPHILS # BLD AUTO: 4.23 THOUSANDS/ÂΜL (ref 1.85–7.62)
NEUTS SEG NFR BLD AUTO: 52 % (ref 43–75)
NITRITE UR QL STRIP: NEGATIVE
NON-SQ EPI CELLS URNS QL MICRO: ABNORMAL /HPF
NONHDLC SERPL-MCNC: 111 MG/DL
NRBC BLD AUTO-RTO: 0 /100 WBCS
PH UR STRIP.AUTO: 7.5 [PH]
PLATELET # BLD AUTO: 252 THOUSANDS/UL (ref 149–390)
PMV BLD AUTO: 11.5 FL (ref 8.9–12.7)
POTASSIUM SERPL-SCNC: 3.7 MMOL/L (ref 3.5–5.3)
PROT SERPL-MCNC: 7 G/DL (ref 6.4–8.4)
PROT UR STRIP-MCNC: ABNORMAL MG/DL
RBC # BLD AUTO: 5.27 MILLION/UL (ref 3.88–5.62)
RBC #/AREA URNS AUTO: ABNORMAL /HPF
SODIUM SERPL-SCNC: 137 MMOL/L (ref 135–147)
SP GR UR STRIP.AUTO: 1.02 (ref 1–1.03)
TRIGL SERPL-MCNC: 127 MG/DL
TSH SERPL DL<=0.05 MIU/L-ACNC: 3.38 UIU/ML (ref 0.45–4.5)
UROBILINOGEN UR STRIP-ACNC: <2 MG/DL
WBC # BLD AUTO: 8.08 THOUSAND/UL (ref 4.31–10.16)
WBC #/AREA URNS AUTO: ABNORMAL /HPF

## 2024-07-27 PROCEDURE — 84443 ASSAY THYROID STIM HORMONE: CPT

## 2024-07-27 PROCEDURE — 82306 VITAMIN D 25 HYDROXY: CPT

## 2024-07-27 PROCEDURE — 85025 COMPLETE CBC W/AUTO DIFF WBC: CPT

## 2024-07-27 PROCEDURE — 81001 URINALYSIS AUTO W/SCOPE: CPT | Performed by: FAMILY MEDICINE

## 2024-07-27 PROCEDURE — 83036 HEMOGLOBIN GLYCOSYLATED A1C: CPT

## 2024-07-27 PROCEDURE — 80061 LIPID PANEL: CPT

## 2024-07-27 PROCEDURE — 36415 COLL VENOUS BLD VENIPUNCTURE: CPT

## 2024-07-27 PROCEDURE — 80053 COMPREHEN METABOLIC PANEL: CPT

## 2024-08-01 ENCOUNTER — HOSPITAL ENCOUNTER (OUTPATIENT)
Facility: MEDICAL CENTER | Age: 64
Discharge: HOME | End: 2024-08-01
Payer: COMMERCIAL

## 2024-08-01 DIAGNOSIS — R97.20 ELEVATED PSA: ICD-10-CM

## 2024-08-01 PROCEDURE — A9585 GADOBUTROL INJECTION: HCPCS | Performed by: UROLOGY

## 2024-08-01 PROCEDURE — 76377 3D RENDER W/INTRP POSTPROCES: CPT

## 2024-08-01 PROCEDURE — 72197 MRI PELVIS W/O & W/DYE: CPT

## 2024-08-01 RX ORDER — GADOBUTROL 604.72 MG/ML
8 INJECTION INTRAVENOUS
Status: COMPLETED | OUTPATIENT
Start: 2024-08-01 | End: 2024-08-01

## 2024-08-01 RX ADMIN — GADOBUTROL 8 ML: 604.72 INJECTION INTRAVENOUS at 12:11

## 2024-09-18 ENCOUNTER — OFFICE VISIT (OUTPATIENT)
Dept: UROLOGY | Facility: CLINIC | Age: 64
End: 2024-09-18
Payer: COMMERCIAL

## 2024-09-18 VITALS
HEIGHT: 69 IN | HEART RATE: 69 BPM | BODY MASS INDEX: 27.55 KG/M2 | OXYGEN SATURATION: 98 % | DIASTOLIC BLOOD PRESSURE: 80 MMHG | WEIGHT: 186 LBS | SYSTOLIC BLOOD PRESSURE: 130 MMHG

## 2024-09-18 DIAGNOSIS — R39.12 BENIGN PROSTATIC HYPERPLASIA WITH WEAK URINARY STREAM: ICD-10-CM

## 2024-09-18 DIAGNOSIS — N40.1 BENIGN PROSTATIC HYPERPLASIA WITH WEAK URINARY STREAM: ICD-10-CM

## 2024-09-18 DIAGNOSIS — R97.20 ELEVATED PSA: Primary | ICD-10-CM

## 2024-09-18 LAB — POST-VOID RESIDUAL VOLUME, ML POC: 37 ML

## 2024-09-18 PROCEDURE — 99213 OFFICE O/P EST LOW 20 MIN: CPT

## 2024-09-18 PROCEDURE — 51798 US URINE CAPACITY MEASURE: CPT

## 2024-09-18 RX ORDER — TAMSULOSIN HYDROCHLORIDE 0.4 MG/1
0.4 CAPSULE ORAL
Qty: 90 CAPSULE | Refills: 1 | Status: SHIPPED | OUTPATIENT
Start: 2024-09-18

## 2024-09-18 NOTE — PROGRESS NOTES
9/18/2024      No chief complaint on file.        Assessment and Plan    63 y.o. male managed by Dr. Conner    Elevated PSA  -8.361 (6/28/24)  -mp MRI (8/01/24) showing PIRADS 2, moderate BPH with 80 g gland.   -We will continue to follow his PSA. His MRI was negative which is reassuring. He will repeat his PSA in 6 months prior to follow-up        Weak Urinary Stream  -Stopped taking Flomax d/t retrograde ejaculation but he states he wants to try taking the medication again.   -PVR 37 mls      Medical comorbidities include HTN, prediabetes.         History of Present Illness  Felix Grimm is a 63 y.o. male here with hx of elevated PSA and BPH with LUTS presenting for follow-up. Patient previously had a negative TRUS biopsy (12/18/19) which revealed active chronic inflammation without findings of malignancy. He also had a negative MRI fusion biopsy (9/13/22). Patient's most recent PSA is 8.361. He defers DARELL today. He had a mp MRI showing PIRADS 2, moderate BPH with 80 g gland (8/01/24).     He is still experiencing mild LUTS. He does state sometimes he will experience frequency and urgency. He also states he does experience a weak stream at times. His PVR is 37 mls today. He states he only took flomax for a short period of time d/t retrograde ejaculation. We discussed that this is a common side effect but not harmful to him. He states he is bothered by his symptoms so he would like to try flomax again. He denies dysuria, flank pain, and gross hematuria.            PSA trend:  11/25/21 7.27   4/5/222 6.01 with 12% free PSA   4/7/22: mpMRI showing PIRADS 4 lesion of the right posterior peripheral zone. Prostate glad 62g. Hypointense lesion of the right iliac bone incompletely evaluated.   9/13/22 MRI fusion biopsy: negative  12/22/22 6.1  6/28/24 8.361      Review of Systems   Constitutional:  Negative for activity change, fatigue and fever.   HENT:  Negative for congestion, rhinorrhea and sore throat.    Eyes:   Negative for photophobia, redness and visual disturbance.   Respiratory:  Negative for cough, shortness of breath and wheezing.    Cardiovascular:  Negative for chest pain, palpitations and leg swelling.   Gastrointestinal:  Negative for abdominal pain, constipation, diarrhea, nausea and vomiting.   Genitourinary:  Positive for frequency and urgency. Negative for dysuria, flank pain and hematuria.   Neurological:  Negative for weakness, light-headedness and headaches.                Vitals  There were no vitals filed for this visit.    Physical Exam  Constitutional:       Appearance: Normal appearance. He is not toxic-appearing.   HENT:      Head: Normocephalic.      Mouth/Throat:      Pharynx: Oropharynx is clear.   Eyes:      Extraocular Movements: Extraocular movements intact.      Pupils: Pupils are equal, round, and reactive to light.   Pulmonary:      Effort: Pulmonary effort is normal. No respiratory distress.   Musculoskeletal:         General: Normal range of motion.      Cervical back: Normal range of motion.   Neurological:      Mental Status: He is alert and oriented to person, place, and time. Mental status is at baseline.      Gait: Gait normal.   Psychiatric:         Mood and Affect: Mood normal.         Behavior: Behavior normal.         Thought Content: Thought content normal.         Judgment: Judgment normal.           Past History  Past Medical History:   Diagnosis Date    Hypertension     Nasal congestion      Social History     Socioeconomic History    Marital status: /Civil Union     Spouse name: Not on file    Number of children: Not on file    Years of education: Not on file    Highest education level: Not on file   Occupational History    Not on file   Tobacco Use    Smoking status: Never    Smokeless tobacco: Never   Vaping Use    Vaping status: Never Used   Substance and Sexual Activity    Alcohol use: Yes     Alcohol/week: 2.0 standard drinks of alcohol     Types: 2 Glasses of  wine per week     Comment: occ    Drug use: Never    Sexual activity: Not Currently   Other Topics Concern    Not on file   Social History Narrative    Not on file     Social Determinants of Health     Financial Resource Strain: Not on file   Food Insecurity: Not on file   Transportation Needs: Not on file   Physical Activity: Not on file   Stress: Not on file   Social Connections: Not on file   Intimate Partner Violence: Not on file   Housing Stability: Not on file     Social History     Tobacco Use   Smoking Status Never   Smokeless Tobacco Never     Family History   Problem Relation Age of Onset    Heart disease Mother     Heart disease Father     Hypertension Father     Diabetes Brother     No Known Problems Brother     No Known Problems Brother     No Known Problems Brother     No Known Problems Brother     No Known Problems Maternal Grandmother     No Known Problems Maternal Grandfather     No Known Problems Paternal Grandmother     No Known Problems Paternal Grandfather        The following portions of the patient's history were reviewed and updated as appropriate: allergies, current medications, past medical history, past social history, past surgical history and problem list.    Results  No results found for this or any previous visit (from the past 1 hour(s)).]  Lab Results   Component Value Date    PSA 8.361 (H) 06/28/2024    PSA 6.1 (H) 12/22/2022     Lab Results   Component Value Date    CALCIUM 9.1 07/27/2024    K 3.7 07/27/2024    CO2 30 07/27/2024     07/27/2024    BUN 21 07/27/2024    CREATININE 1.08 07/27/2024     Lab Results   Component Value Date    WBC 8.08 07/27/2024    HGB 15.4 07/27/2024    HCT 47.4 07/27/2024    MCV 90 07/27/2024     07/27/2024       SOLEDAD Dillon

## 2025-02-07 ENCOUNTER — OFFICE VISIT (OUTPATIENT)
Dept: FAMILY MEDICINE CLINIC | Facility: CLINIC | Age: 65
End: 2025-02-07
Payer: COMMERCIAL

## 2025-02-07 VITALS
TEMPERATURE: 98.4 F | HEIGHT: 69 IN | DIASTOLIC BLOOD PRESSURE: 82 MMHG | HEART RATE: 73 BPM | RESPIRATION RATE: 16 BRPM | BODY MASS INDEX: 28.44 KG/M2 | WEIGHT: 192 LBS | OXYGEN SATURATION: 98 % | SYSTOLIC BLOOD PRESSURE: 124 MMHG

## 2025-02-07 DIAGNOSIS — R97.20 ELEVATED PSA: ICD-10-CM

## 2025-02-07 DIAGNOSIS — I10 PRIMARY HYPERTENSION: Primary | ICD-10-CM

## 2025-02-07 DIAGNOSIS — E55.9 VITAMIN D DEFICIENCY: ICD-10-CM

## 2025-02-07 DIAGNOSIS — R73.03 PREDIABETES: ICD-10-CM

## 2025-02-07 DIAGNOSIS — Z86.0100 HISTORY OF COLON POLYPS: ICD-10-CM

## 2025-02-07 LAB — SL AMB POCT HEMOGLOBIN AIC: 6.2 (ref ?–6.5)

## 2025-02-07 PROCEDURE — 83036 HEMOGLOBIN GLYCOSYLATED A1C: CPT | Performed by: FAMILY MEDICINE

## 2025-02-07 PROCEDURE — 99214 OFFICE O/P EST MOD 30 MIN: CPT | Performed by: FAMILY MEDICINE

## 2025-02-07 NOTE — ASSESSMENT & PLAN NOTE
Had tubular adenoma needs repeat  colonoscopy  Orders:  •  Ambulatory Referral to General Surgery; Future

## 2025-02-07 NOTE — PROGRESS NOTES
"Name: Felix Grimm      : 1960      MRN: [id number]  Encounter Provider: Renee Pugh MD  Encounter Date: 2025   Encounter department: [facility]  :  Assessment & Plan  Primary hypertension  Well controlled on current therapy continue with current medications and will reassess next visit         Prediabetes  Check hga1c  Orders:  •  POCT hemoglobin A1c    Vitamin D deficiency  Last level nl       Elevated PSA  Urology note reviewed   cont monitoring       History of colon polyps  Had tubular adenoma needs repeat  colonoscopy  Orders:  •  Ambulatory Referral to General Surgery; Future           History of Present Illness   Pt is here for interval visit and evaluation of multiple medical problems, review of medications, labs, Health Maintenance and any recent specialty consults        Review of Systems   Constitutional:  Negative for appetite change, chills, fatigue and fever.   Respiratory:  Negative for cough, chest tightness and shortness of breath.    Cardiovascular:  Negative for chest pain, palpitations and leg swelling.   Gastrointestinal:  Negative for abdominal pain, constipation, diarrhea, nausea and vomiting.   Genitourinary:  Negative for difficulty urinating and frequency.   Musculoskeletal:  Negative for arthralgias, back pain, gait problem and neck pain.   Skin:  Negative for rash.   Neurological:  Negative for dizziness, weakness, light-headedness, numbness and headaches.   Hematological:  Does not bruise/bleed easily.   Psychiatric/Behavioral:  Negative for dysphoric mood and sleep disturbance. The patient is not nervous/anxious.        Objective   /82 (BP Location: Left arm, Patient Position: Sitting, Cuff Size: Standard)   Pulse 73   Temp 98.4 °F (36.9 °C) (Tympanic)   Resp 16   Ht 5' 9\" (1.753 m)   Wt 87.1 kg (192 lb)   SpO2 98%   BMI 28.35 kg/m²      Physical Exam  Vitals and nursing note reviewed.   Constitutional:       General: He is " not in acute distress.     Appearance: Normal appearance. He is well-developed.   Eyes:      Conjunctiva/sclera: Conjunctivae normal.      Pupils: Pupils are equal, round, and reactive to light.   Neck:      Thyroid: No thyromegaly.   Cardiovascular:      Rate and Rhythm: Normal rate and regular rhythm.      Pulses: Normal pulses.      Heart sounds: Normal heart sounds. No murmur heard.     No friction rub.   Pulmonary:      Effort: Pulmonary effort is normal. No respiratory distress.      Breath sounds: Normal breath sounds. No wheezing.   Abdominal:      General: Bowel sounds are normal. There is no distension.      Palpations: Abdomen is soft. There is no mass.      Tenderness: There is no abdominal tenderness. There is no guarding.      Hernia: No hernia is present.   Musculoskeletal:      Cervical back: Normal range of motion and neck supple.      Right lower leg: No edema.      Left lower leg: No edema.   Lymphadenopathy:      Cervical: No cervical adenopathy.   Skin:     General: Skin is warm and dry.      Findings: No erythema or rash.   Neurological:      General: No focal deficit present.      Mental Status: He is alert and oriented to person, place, and time.      Cranial Nerves: No cranial nerve deficit.      Motor: No weakness.      Gait: Gait normal.      Deep Tendon Reflexes: Reflexes normal.   Psychiatric:         Mood and Affect: Mood normal.

## 2025-03-08 DIAGNOSIS — N40.1 BENIGN PROSTATIC HYPERPLASIA WITH WEAK URINARY STREAM: ICD-10-CM

## 2025-03-08 DIAGNOSIS — R39.12 BENIGN PROSTATIC HYPERPLASIA WITH WEAK URINARY STREAM: ICD-10-CM

## 2025-03-10 RX ORDER — TAMSULOSIN HYDROCHLORIDE 0.4 MG/1
0.4 CAPSULE ORAL
Qty: 90 CAPSULE | Refills: 1 | Status: SHIPPED | OUTPATIENT
Start: 2025-03-10

## 2025-03-13 ENCOUNTER — RESULTS FOLLOW-UP (OUTPATIENT)
Dept: UROLOGY | Facility: CLINIC | Age: 65
End: 2025-03-13

## 2025-03-13 ENCOUNTER — APPOINTMENT (OUTPATIENT)
Dept: LAB | Facility: HOSPITAL | Age: 65
End: 2025-03-13
Payer: COMMERCIAL

## 2025-03-13 ENCOUNTER — OFFICE VISIT (OUTPATIENT)
Dept: SURGERY | Facility: CLINIC | Age: 65
End: 2025-03-13
Payer: COMMERCIAL

## 2025-03-13 VITALS
TEMPERATURE: 96.4 F | OXYGEN SATURATION: 99 % | HEART RATE: 71 BPM | SYSTOLIC BLOOD PRESSURE: 130 MMHG | WEIGHT: 194 LBS | BODY MASS INDEX: 28.73 KG/M2 | DIASTOLIC BLOOD PRESSURE: 82 MMHG | HEIGHT: 69 IN

## 2025-03-13 DIAGNOSIS — R97.20 ELEVATED PSA: ICD-10-CM

## 2025-03-13 DIAGNOSIS — Z86.0100 HISTORY OF COLON POLYPS: Primary | ICD-10-CM

## 2025-03-13 LAB — PSA SERPL-MCNC: 10.27 NG/ML (ref 0–4)

## 2025-03-13 PROCEDURE — 84153 ASSAY OF PSA TOTAL: CPT

## 2025-03-13 PROCEDURE — 99213 OFFICE O/P EST LOW 20 MIN: CPT | Performed by: SURGERY

## 2025-03-13 PROCEDURE — 36415 COLL VENOUS BLD VENIPUNCTURE: CPT

## 2025-03-13 NOTE — PROGRESS NOTES
Name: Felix Grimm      : 1960      MRN: [id number]  Encounter Provider: Robert Bloch, MD  Encounter Date: 3/13/2025   Encounter department: [facility]  :  Assessment & Plan  History of colon polyps             History of Present Illness   Felix Grimm is a 64 y.o. male who presents   The patient is a 64-year-old  male who underwent colonoscopy in the past.  On the last go around 3 years ago he had a rectal tubular adenoma which was hard to get out but I did get it out.  He is due for interval colonoscopy.  He has noticed no bleeding on this go or      Review of Systems   Constitutional:         Wt stable  Covid 0  Had 2 covid vaccines   HENT: Negative.     Eyes:         Readers  cataracts   Respiratory:          Never smoked   Cardiovascular:         Htn   Gastrointestinal:         Hx polyps  S/p appy   Endocrine: Negative.    Genitourinary:         Nocturia x 1   Musculoskeletal: Negative.    Skin: Negative.    Neurological:  Positive for headaches.   Hematological: Negative.    Psychiatric/Behavioral:  The patient is nervous/anxious.     as per HPI.  Past Medical History   Past Medical History:   Diagnosis Date    Hypertension     Nasal congestion      Past Surgical History:   Procedure Laterality Date    APPENDECTOMY      COLONOSCOPY      [place] BX PROSTATE STRTCTC SATURATION SAMPLING IMG GID N/A 2022    Procedure: TRANSPERINEAL MRI FUSION  BIOPSY PROSTATE;  Surgeon: Artem Conner MD;  Location: [facility] MAIN OR;  Service: Urology     Family History   Problem Relation Age of Onset    Heart disease Mother     Heart disease Father     Hypertension Father     Diabetes Brother     No Known Problems Brother     No Known Problems Brother     No Known Problems Brother     No Known Problems Brother     No Known Problems Maternal Grandmother     No Known Problems Maternal Grandfather     No Known Problems Paternal Grandmother     No Known Problems Paternal Grandfather   "     reports that he has never smoked. He has never used smokeless tobacco. He reports current alcohol use of about 2.0 standard drinks of alcohol per week. He reports that he does not use drugs.  Current Outpatient Medications   Medication Instructions    losartan-hydrochlorothiazide (HYZAAR) 50-12.5 mg per tablet 1 tablet, Oral, Daily    tamsulosin (FLOMAX) 0.4 mg, Oral, Daily with dinner   No Known Allergies      Objective   /82 (BP Location: Left arm, Patient Position: Sitting, Cuff Size: Standard)   Pulse 71   Temp (!) 96.4 °F (35.8 °C) (Tympanic)   Ht 5' 9\" (1.753 m)   Wt 88 kg (194 lb)   SpO2 99%   BMI 28.65 kg/m²      Physical Exam  Vitals reviewed.   Constitutional:       Appearance: Normal appearance. He is normal weight. He is not ill-appearing.   HENT:      Head: Normocephalic and atraumatic.   Eyes:      General: No scleral icterus.     Conjunctiva/sclera: Conjunctivae normal.   Cardiovascular:      Rate and Rhythm: Regular rhythm. Tachycardia present.      Heart sounds: Normal heart sounds. No murmur heard.  Pulmonary:      Effort: Pulmonary effort is normal. No respiratory distress.      Breath sounds: Normal breath sounds. No stridor. No wheezing, rhonchi or rales.   Abdominal:      General: There is no distension.      Palpations: Abdomen is soft. There is no mass.   Musculoskeletal:         General: Normal range of motion.      Cervical back: Normal range of motion.   Lymphadenopathy:      Cervical: No cervical adenopathy.   Skin:     Coloration: Skin is not jaundiced.   Neurological:      Mental Status: He is alert and oriented to person, place, and time.   Psychiatric:         Mood and Affect: Mood normal.         Behavior: Behavior normal.         Thought Content: Thought content normal.         Judgment: Judgment normal.     Impression is need for interval colonoscopy.  This has been scheduled          "

## 2025-03-14 NOTE — TELEPHONE ENCOUNTER
Patient returning call, informed of message below and confirmed upcoming appointment    SOLEDAD Slater to [facility]  Regarding result: PSA Total, Diagnostic     3/13/25  4:02 PM  Result Note  Patient's PSA continues to increase.  To to be discussed at follow-up in office on 3-20.

## 2025-03-14 NOTE — TELEPHONE ENCOUNTER
PSA continues to increase. To to be discussed at follow-up in office on 3-20.   ----- Message from SOLEDAD Dillon sent at 3/13/2025  4:02 PM EDT -----  Patient's PSA continues to increase.  To to be discussed at follow-up in office on 3-20.

## 2025-03-20 ENCOUNTER — OFFICE VISIT (OUTPATIENT)
Dept: UROLOGY | Facility: CLINIC | Age: 65
End: 2025-03-20
Payer: COMMERCIAL

## 2025-03-20 VITALS
BODY MASS INDEX: 27.85 KG/M2 | WEIGHT: 188 LBS | HEIGHT: 69 IN | OXYGEN SATURATION: 96 % | DIASTOLIC BLOOD PRESSURE: 89 MMHG | SYSTOLIC BLOOD PRESSURE: 157 MMHG | HEART RATE: 80 BPM

## 2025-03-20 DIAGNOSIS — R39.12 BENIGN PROSTATIC HYPERPLASIA WITH WEAK URINARY STREAM: Primary | ICD-10-CM

## 2025-03-20 DIAGNOSIS — N40.1 BENIGN PROSTATIC HYPERPLASIA WITH WEAK URINARY STREAM: Primary | ICD-10-CM

## 2025-03-20 DIAGNOSIS — R97.20 ELEVATED PSA: ICD-10-CM

## 2025-03-20 LAB — POST-VOID RESIDUAL VOLUME, ML POC: 56 ML

## 2025-03-20 PROCEDURE — 99213 OFFICE O/P EST LOW 20 MIN: CPT

## 2025-03-20 PROCEDURE — 51798 US URINE CAPACITY MEASURE: CPT

## 2025-03-20 NOTE — PROGRESS NOTES
Name: Felix Grimm      : 1960      MRN: [id number]  Encounter Provider: SOLEDAD Dillon  Encounter Date: 3/20/2025   Encounter department: [facility]OLOGY [facility]  :  Assessment & Plan  Benign prostatic hyperplasia with weak urinary stream  -PVR 56 mls today.  -Continue 0.4 mg daily tamsulosin.  Content with urinary symptoms.  Orders:    POCT Measure PVR    Elevated PSA  -PSA has increased to 10.271 on 3-13-25.  We discussed reasons for falsely elevated PSA, patient states that he does not think factors such as biking, sexual activity within 3 days of the test apply to him.  He defers a DARELL today in the office.  -We did have an in-depth discussion repeating a multiparametric prostate MRI.  I recommend this as his PSA has increased.  Prostate MRI will further evaluate for any lesions concerning for prostate cancer as well as determine if patient needs fusion biopsy if PI-RADS 4 or 5.  -We will notify patient of results.  All questions addressed.  Please do not hesitate to reach out with further questions or concerns.  Orders:    MRI prostate multiparametric wo w contrast; Future    Basic metabolic panel; Future             History of Present Illness   Felix Grimm is a 64 y.o. male who presents here with hx of elevated PSA and BPH with LUTS presenting for follow-up.  Patient last seen by me in 2024.  At that time, we restarted patient on 0.4 mg daily tamsulosin for mild lower urinary tract symptoms. He states it is helping with urgency and nocturia. Occasional leakage.      Patient previously had a negative TRUS biopsy (19) which revealed active chronic inflammation without findings of malignancy. He also had a negative MRI fusion biopsy (22). Patient's most recent PSA has now risen to 10.271 completed on 3-13-25.. He defers DARELL today. He had a mp MRI showing PIRADS 2, moderate BPH with 80 g gland (24).       He denies dysuria, flank pain, and gross  "hematuria.       PSA trends:  10.271 3/13/25  8.361 6/28/24  6.1 12/22/22  MRI fusion biopsy: negative 9/13/22  mpMRI showing PIRADS 4 lesion of the right posterior peripheral zone. Prostate glad 62g. Hypointense lesion of the right iliac bone incompletely evaluated.  4/7/22.  6.01 with 12% free PSA.  4/5/22   7.27 11/25/21          Review of Systems   Constitutional:  Negative for activity change, chills, fatigue and fever.   HENT:  Negative for congestion, rhinorrhea and sore throat.    Eyes:  Negative for photophobia, redness and visual disturbance.   Respiratory:  Negative for cough, shortness of breath and wheezing.    Cardiovascular:  Negative for chest pain, palpitations and leg swelling.   Gastrointestinal:  Negative for abdominal pain, diarrhea, nausea and vomiting.   Genitourinary:  Negative for difficulty urinating, dysuria, flank pain, frequency, hematuria and urgency.   Neurological:  Negative for weakness, light-headedness and headaches.          Objective   /89 (BP Location: Left arm, Patient Position: Sitting, Cuff Size: Standard)   Pulse 80   Ht 5' 9\" (1.753 m)   Wt 85.3 kg (188 lb)   SpO2 96%   BMI 27.76 kg/m²     Physical Exam  Vitals and nursing note reviewed.   Constitutional:       General: He is not in acute distress.     Appearance: He is well-developed.   HENT:      Head: Normocephalic and atraumatic.   Eyes:      Conjunctiva/sclera: Conjunctivae normal.   Cardiovascular:      Rate and Rhythm: Normal rate and regular rhythm.      Heart sounds: No murmur heard.  Pulmonary:      Effort: Pulmonary effort is normal. No respiratory distress.      Breath sounds: Normal breath sounds.   Abdominal:      Palpations: Abdomen is soft.      Tenderness: There is no abdominal tenderness.   Genitourinary:     Comments: Defers DARELL  Musculoskeletal:         General: No swelling.      Cervical back: Neck supple.   Skin:     General: Skin is warm and dry.      Capillary Refill: Capillary refill " takes less than 2 seconds.   Neurological:      Mental Status: He is alert.   Psychiatric:         Mood and Affect: Mood normal.          Results   Lab Results   Component Value Date    PSA 10.271 (H) 03/13/2025    PSA 8.361 (H) 06/28/2024    PSA 6.1 (H) 12/22/2022     Lab Results   Component Value Date    CALCIUM 9.1 07/27/2024    K 3.7 07/27/2024    CO2 30 07/27/2024     07/27/2024    BUN 21 07/27/2024    CREATININE 1.08 07/27/2024     Lab Results   Component Value Date    WBC 8.08 07/27/2024    HGB 15.4 07/27/2024    HCT 47.4 07/27/2024    MCV 90 07/27/2024     07/27/2024       Office Urine Dip  Recent Results (from the past hour)   POCT Measure PVR    Collection Time: 03/20/25  8:52 AM   Result Value Ref Range    POST-VOID RESIDUAL VOLUME, ML POC 56 mL

## 2025-03-20 NOTE — ASSESSMENT & PLAN NOTE
-PSA has increased to 10.271 on 3-13-25.  We discussed reasons for falsely elevated PSA, patient states that he does not think factors such as biking, sexual activity within 3 days of the test apply to him.  He defers a DARELL today in the office.  -We did have an in-depth discussion repeating a multiparametric prostate MRI.  I recommend this as his PSA has increased.  Prostate MRI will further evaluate for any lesions concerning for prostate cancer as well as determine if patient needs fusion biopsy if PI-RADS 4 or 5.  -We will notify patient of results.  All questions addressed.  Please do not hesitate to reach out with further questions or concerns.  Orders:    MRI prostate multiparametric wo w contrast; Future    Basic metabolic panel; Future

## 2025-04-15 ENCOUNTER — APPOINTMENT (OUTPATIENT)
Dept: LAB | Facility: HOSPITAL | Age: 65
End: 2025-04-15
Payer: COMMERCIAL

## 2025-04-15 DIAGNOSIS — R97.20 ELEVATED PSA: ICD-10-CM

## 2025-04-15 LAB
ANION GAP SERPL CALCULATED.3IONS-SCNC: 5 MMOL/L (ref 4–13)
BUN SERPL-MCNC: 17 MG/DL (ref 5–25)
CALCIUM SERPL-MCNC: 9.2 MG/DL (ref 8.4–10.2)
CHLORIDE SERPL-SCNC: 101 MMOL/L (ref 96–108)
CO2 SERPL-SCNC: 34 MMOL/L (ref 21–32)
CREAT SERPL-MCNC: 1.11 MG/DL (ref 0.6–1.3)
GFR SERPL CREATININE-BSD FRML MDRD: 69 ML/MIN/1.73SQ M
GLUCOSE P FAST SERPL-MCNC: 106 MG/DL (ref 65–99)
POTASSIUM SERPL-SCNC: 4 MMOL/L (ref 3.5–5.3)
PSA SERPL-MCNC: 9.66 NG/ML (ref 0–4)
SODIUM SERPL-SCNC: 140 MMOL/L (ref 135–147)

## 2025-04-15 PROCEDURE — 80048 BASIC METABOLIC PNL TOTAL CA: CPT

## 2025-04-15 PROCEDURE — 84153 ASSAY OF PSA TOTAL: CPT

## 2025-04-20 DIAGNOSIS — I10 PRIMARY HYPERTENSION: ICD-10-CM

## 2025-04-20 RX ORDER — LOSARTAN POTASSIUM AND HYDROCHLOROTHIAZIDE 12.5; 5 MG/1; MG/1
1 TABLET ORAL DAILY
Qty: 90 TABLET | Refills: 1 | Status: SHIPPED | OUTPATIENT
Start: 2025-04-20

## 2025-04-21 ENCOUNTER — HOSPITAL ENCOUNTER (OUTPATIENT)
Dept: RADIOLOGY | Age: 65
Discharge: HOME | End: 2025-04-21
Payer: COMMERCIAL

## 2025-04-21 DIAGNOSIS — R97.20 ELEVATED PSA: ICD-10-CM

## 2025-04-21 PROCEDURE — A9585 GADOBUTROL INJECTION: HCPCS

## 2025-04-21 PROCEDURE — 72197 MRI PELVIS W/O & W/DYE: CPT

## 2025-04-21 PROCEDURE — 76377 3D RENDER W/INTRP POSTPROCES: CPT

## 2025-04-21 RX ORDER — GADOBUTROL 604.72 MG/ML
9 INJECTION INTRAVENOUS
Status: COMPLETED | OUTPATIENT
Start: 2025-04-21 | End: 2025-04-21

## 2025-04-21 RX ADMIN — GADOBUTROL 9 ML: 604.72 INJECTION INTRAVENOUS at 14:40

## 2025-04-23 ENCOUNTER — RESULTS FOLLOW-UP (OUTPATIENT)
Dept: UROLOGY | Facility: CLINIC | Age: 65
End: 2025-04-23

## 2025-04-23 DIAGNOSIS — R97.20 ELEVATED PSA: Primary | ICD-10-CM

## 2025-04-23 NOTE — TELEPHONE ENCOUNTER
Ok per consent to leave message.      Message left relaying note below and office number incase pt has any questions or concerns         ----- Message from SOLEDAD Dillon sent at 4/23/2025 10:36 AM EDT -----  Patient's prostate MRI is negative.  He does have an enlarged prostate.  We should plan to repeat his PSA in 3 months.  I have ordered this.

## 2025-04-24 ENCOUNTER — TELEPHONE (OUTPATIENT)
Dept: PREADMISSION TESTING | Facility: HOSPITAL | Age: 65
End: 2025-04-24

## 2025-04-24 ENCOUNTER — TELEPHONE (OUTPATIENT)
Age: 65
End: 2025-04-24

## 2025-04-25 NOTE — TELEPHONE ENCOUNTER
Patient tried to return call.  I told him that it's for preadmission testing for procedure on 05/07.  Please call him at [phone]

## 2025-05-07 ENCOUNTER — HOSPITAL ENCOUNTER (OUTPATIENT)
Dept: GASTROENTEROLOGY | Facility: HOSPITAL | Age: 65
Setting detail: OUTPATIENT SURGERY
Discharge: HOME | End: 2025-05-07
Attending: SURGERY
Payer: COMMERCIAL

## 2025-05-07 ENCOUNTER — ANESTHESIA EVENT (OUTPATIENT)
Dept: GASTROENTEROLOGY | Facility: HOSPITAL | Age: 65
End: 2025-05-07
Payer: COMMERCIAL

## 2025-05-07 ENCOUNTER — ANESTHESIA (OUTPATIENT)
Dept: GASTROENTEROLOGY | Facility: HOSPITAL | Age: 65
End: 2025-05-07
Payer: COMMERCIAL

## 2025-05-07 VITALS
BODY MASS INDEX: 26.83 KG/M2 | HEIGHT: 68 IN | HEART RATE: 57 BPM | SYSTOLIC BLOOD PRESSURE: 117 MMHG | WEIGHT: 177 LBS | DIASTOLIC BLOOD PRESSURE: 67 MMHG | TEMPERATURE: 98.3 F | OXYGEN SATURATION: 98 % | RESPIRATION RATE: 16 BRPM

## 2025-05-07 DIAGNOSIS — Z86.0100 HISTORY OF COLON POLYPS: ICD-10-CM

## 2025-05-07 PROCEDURE — 45385 COLONOSCOPY W/LESION REMOVAL: CPT | Performed by: SURGERY

## 2025-05-07 PROCEDURE — 88305 TISSUE EXAM BY PATHOLOGIST: CPT | Performed by: PATHOLOGY

## 2025-05-07 RX ORDER — SODIUM CHLORIDE, SODIUM LACTATE, POTASSIUM CHLORIDE, CALCIUM CHLORIDE 600; 310; 30; 20 MG/100ML; MG/100ML; MG/100ML; MG/100ML
INJECTION, SOLUTION INTRAVENOUS CONTINUOUS PRN
Status: DISCONTINUED | OUTPATIENT
Start: 2025-05-07 | End: 2025-05-07

## 2025-05-07 RX ORDER — LIDOCAINE HYDROCHLORIDE 10 MG/ML
INJECTION, SOLUTION EPIDURAL; INFILTRATION; INTRACAUDAL; PERINEURAL AS NEEDED
Status: DISCONTINUED | OUTPATIENT
Start: 2025-05-07 | End: 2025-05-07

## 2025-05-07 RX ORDER — PROPOFOL 10 MG/ML
INJECTION, EMULSION INTRAVENOUS AS NEEDED
Status: DISCONTINUED | OUTPATIENT
Start: 2025-05-07 | End: 2025-05-07

## 2025-05-07 RX ADMIN — LIDOCAINE HYDROCHLORIDE 50 MG: 10 INJECTION, SOLUTION EPIDURAL; INFILTRATION; INTRACAUDAL at 12:32

## 2025-05-07 RX ADMIN — SODIUM CHLORIDE, SODIUM LACTATE, POTASSIUM CHLORIDE, AND CALCIUM CHLORIDE: .6; .31; .03; .02 INJECTION, SOLUTION INTRAVENOUS at 12:30

## 2025-05-07 RX ADMIN — PROPOFOL 50 MG: 10 INJECTION, EMULSION INTRAVENOUS at 12:37

## 2025-05-07 RX ADMIN — PROPOFOL 50 MG: 10 INJECTION, EMULSION INTRAVENOUS at 12:54

## 2025-05-07 RX ADMIN — PROPOFOL 150 MG: 10 INJECTION, EMULSION INTRAVENOUS at 12:32

## 2025-05-07 RX ADMIN — PROPOFOL 50 MG: 10 INJECTION, EMULSION INTRAVENOUS at 12:40

## 2025-05-07 RX ADMIN — PROPOFOL 50 MG: 10 INJECTION, EMULSION INTRAVENOUS at 12:48

## 2025-05-07 RX ADMIN — PROPOFOL 50 MG: 10 INJECTION, EMULSION INTRAVENOUS at 12:43

## 2025-05-07 NOTE — ANESTHESIA POSTPROCEDURE EVALUATION
Post-Op Assessment Note    CV Status:  Stable    Pain management: adequate       Mental Status:  Alert and awake   Hydration Status:  Euvolemic   PONV Controlled:  Controlled   Airway Patency:  Patent     Post Op Vitals Reviewed: Yes    No anethesia notable event occurred.    Staff: CRNA           Last Filed PACU Vitals:  Vitals Value Taken Time   Temp     Pulse     BP     Resp     SpO2

## 2025-05-07 NOTE — ANESTHESIA PREPROCEDURE EVALUATION
Procedure:  COLONOSCOPY    Relevant Problems   CARDIO   (+) Primary hypertension        Physical Exam    Airway    Mallampati score: II  TM Distance: >3 FB  Neck ROM: full     Dental       Cardiovascular      Pulmonary      Other Findings        Anesthesia Plan  ASA Score- 2     Anesthesia Type- IV sedation with anesthesia with ASA Monitors.         Additional Monitors:     Airway Plan:            Plan Factors-Exercise tolerance (METS): >4 METS.    Chart reviewed. EKG reviewed. Imaging results reviewed. Existing labs reviewed. Patient summary reviewed.                  Induction- intravenous.    Postoperative Plan-         Informed Consent- Anesthetic plan and risks discussed with patient.  I personally reviewed this patient with the CRNA. Discussed and agreed on the Anesthesia Plan with the CRNA..      NPO Status:  Vitals Value Taken Time   Date of last liquid 05/07/25 05/07/25 1145   Time of last liquid 0700 05/07/25 1145   Date of last solid 05/05/25 05/07/25 1145   Time of last solid 1800 05/07/25 1145

## 2025-05-09 ENCOUNTER — TELEPHONE (OUTPATIENT)
Age: 65
End: 2025-05-09

## 2025-05-09 ENCOUNTER — NURSE TRIAGE (OUTPATIENT)
Age: 65
End: 2025-05-09

## 2025-05-09 NOTE — TELEPHONE ENCOUNTER
"FOLLOW UP: Patient advised, to reach back out to GI and make aware of post procedure temperature.      Advised patient to monitor temperature.  Today, 100.7.  Patient is to call back, if fever starts to go back up.      Please advise on any further recommendations.    REASON FOR CONVERSATION: Rectal Bleeding    SYMPTOMS: Patient had a colonoscopy on 5/7.  After the procedure, he had a bout of bloody diarrhea, that turned the toilet water red. He has not had any bleeding since.  He did have a polyp snared during colonoscopy.  Denies S/S of dehydration.  He also spiked a fever, T-max 101.8.      On 5/8, again had fevers. Reached out to GI and has not heard back.     Today temperature is 100.7.  Patient denies any abdominal discomfort or pain.  Denies N/V.  Denies abdominal bloating and he is passing flatus.  Denies URI symptoms or urinary symptoms.  Unsure what is causing temperature.      OTHER: Encouraged to call back with questions or concerns.      DISPOSITION: Home Care    Reason for Disposition   Rectal bleeding is minimal (e.g., blood just on toilet paper, a few drops in toilet bowl)    Answer Assessment - Initial Assessment Questions  1. APPEARANCE of BLOOD: \"What color is it?\" \"Is it passed separately, on the surface of the stool, or mixed in with the stool?\"       See note   2. AMOUNT: \"How much blood was passed?\"       1 time   3. FREQUENCY: \"How many times has blood been passed with the stools?\"       N/A  4. ONSET: \"When was the blood first seen in the stools?\" (Days or weeks)       Patient had a colonoscopy on 5/7, that night fever. 5/8 Temp 101.8 tmax. Today, 100.7.  5/7 passed blood with stool after procedure    5. DIARRHEA: \"Is there also some diarrhea?\" If Yes, ask: \"How many diarrhea stools in the past 24 hours?\"       N/A  6. CONSTIPATION: \"Do you have constipation?\" If Yes, ask: \"How bad is it?\"      N/A  7. RECURRENT SYMPTOMS: \"Have you had blood in your stools before?\" If Yes, ask: \"When was the " "last time?\" and \"What happened that time?\"       N/A   8. BLOOD THINNERS: \"Do you take any blood thinners?\" (e.g., Coumadin/warfarin, Pradaxa/dabigatran, aspirin)      N/A   9. OTHER SYMPTOMS: \"Do you have any other symptoms?\"  (e.g., abdomen pain, vomiting, dizziness, fever)      Denies    Protocols used: Rectal Bleeding-Adult-[place]    "

## 2025-05-09 NOTE — TELEPHONE ENCOUNTER
Regarding: Fever, bloody stool  ----- Message from Saige URIOSTEGUI sent at 5/9/2025  7:59 AM EDT -----  Light fever (100 degree) after colonoscopy  (Newest Message First)Felix Grimm to [facility] (supporting Renee Pugh MD)         5/8/25  8:08 PM  Last check 101.6 degrees   Felix Grimm to [facility] (supporting Renee Pugh MD)         5/8/25  7:44 PM  I did the surgery yesterday afternoon. After,  I had a light fever in evening.   After I went to bed. I felt better.  Today. I went to work and this afternoon the fever came back. About 100 degrees.  Yesterday,  I had blood on stools  every time I went to bathroom. Even it has decreased , I feel my body very sore.   Do you recommend Tylenol? Or anything else? I had texted Dr Davis but i am not sure If he will answer me by tomorrow.

## 2025-05-09 NOTE — TELEPHONE ENCOUNTER
"Called pt to discuss symptoms. Pt reports he has not had BM since 2 days ago so has not had any blood with BM since then.   Pt states he feels a little better today, no fever at this time and overall feels \"well.\"   Advised pt to call back with new or worsening symptoms, or if fever returns. Educated to keep well hydrated.   Pt verbalized understanding.   "

## 2025-05-10 ENCOUNTER — HOSPITAL ENCOUNTER (EMERGENCY)
Facility: HOSPITAL | Age: 65
Discharge: HOME | End: 2025-05-10
Attending: EMERGENCY MEDICINE | Admitting: EMERGENCY MEDICINE
Payer: COMMERCIAL

## 2025-05-10 ENCOUNTER — APPOINTMENT (EMERGENCY)
Dept: CT IMAGING | Facility: HOSPITAL | Age: 65
End: 2025-05-10
Payer: COMMERCIAL

## 2025-05-10 ENCOUNTER — APPOINTMENT (EMERGENCY)
Dept: RADIOLOGY | Facility: HOSPITAL | Age: 65
End: 2025-05-10
Payer: COMMERCIAL

## 2025-05-10 VITALS
OXYGEN SATURATION: 96 % | HEART RATE: 61 BPM | DIASTOLIC BLOOD PRESSURE: 55 MMHG | TEMPERATURE: 98.7 F | RESPIRATION RATE: 16 BRPM | SYSTOLIC BLOOD PRESSURE: 123 MMHG

## 2025-05-10 DIAGNOSIS — R91.1 PULMONARY NODULE: ICD-10-CM

## 2025-05-10 DIAGNOSIS — R10.9 ABDOMINAL PAIN: ICD-10-CM

## 2025-05-10 DIAGNOSIS — R50.9 FEVER: Primary | ICD-10-CM

## 2025-05-10 DIAGNOSIS — R05.9 COUGH: ICD-10-CM

## 2025-05-10 LAB
ALBUMIN SERPL BCG-MCNC: 4 G/DL (ref 3.5–5)
ALP SERPL-CCNC: 70 U/L (ref 34–104)
ALT SERPL W P-5'-P-CCNC: 12 U/L (ref 7–52)
ANION GAP SERPL CALCULATED.3IONS-SCNC: 10 MMOL/L (ref 4–13)
APTT PPP: 29 SECONDS (ref 23–34)
AST SERPL W P-5'-P-CCNC: 13 U/L (ref 13–39)
BACTERIA UR QL AUTO: NORMAL /HPF
BASOPHILS # BLD AUTO: 0.05 THOUSANDS/ÂΜL (ref 0–0.1)
BASOPHILS NFR BLD AUTO: 0 % (ref 0–1)
BILIRUB SERPL-MCNC: 0.65 MG/DL (ref 0.2–1)
BILIRUB UR QL STRIP: NEGATIVE
BUN SERPL-MCNC: 19 MG/DL (ref 5–25)
CALCIUM SERPL-MCNC: 9.2 MG/DL (ref 8.4–10.2)
CHLORIDE SERPL-SCNC: 100 MMOL/L (ref 96–108)
CLARITY UR: CLEAR
CO2 SERPL-SCNC: 25 MMOL/L (ref 21–32)
COLOR UR: YELLOW
CREAT SERPL-MCNC: 1.15 MG/DL (ref 0.6–1.3)
EOSINOPHIL # BLD AUTO: 0.05 THOUSAND/ÂΜL (ref 0–0.61)
EOSINOPHIL NFR BLD AUTO: 0 % (ref 0–6)
ERYTHROCYTE [DISTWIDTH] IN BLOOD BY AUTOMATED COUNT: 13.3 % (ref 11.6–15.1)
FLUAV RNA RESP QL NAA+PROBE: NEGATIVE
FLUBV RNA RESP QL NAA+PROBE: NEGATIVE
GFR SERPL CREATININE-BSD FRML MDRD: 66 ML/MIN/1.73SQ M
GLUCOSE SERPL-MCNC: 117 MG/DL (ref 65–140)
GLUCOSE UR STRIP-MCNC: NEGATIVE MG/DL
HCT VFR BLD AUTO: 42.4 % (ref 36.5–49.3)
HGB BLD-MCNC: 14.2 G/DL (ref 12–17)
HGB UR QL STRIP.AUTO: ABNORMAL
IMM GRANULOCYTES # BLD AUTO: 0.05 THOUSAND/UL (ref 0–0.2)
IMM GRANULOCYTES NFR BLD AUTO: 0 % (ref 0–2)
INR PPP: 1.13 (ref 0.85–1.19)
KETONES UR STRIP-MCNC: NEGATIVE MG/DL
LACTATE SERPL-SCNC: 0.8 MMOL/L (ref 0.5–2)
LEUKOCYTE ESTERASE UR QL STRIP: NEGATIVE
LIPASE SERPL-CCNC: 35 U/L (ref 11–82)
LYMPHOCYTES # BLD AUTO: 1.65 THOUSANDS/ÂΜL (ref 0.6–4.47)
LYMPHOCYTES NFR BLD AUTO: 12 % (ref 14–44)
MCH RBC QN AUTO: 29.5 PG (ref 26.8–34.3)
MCHC RBC AUTO-ENTMCNC: 33.5 G/DL (ref 31.4–37.4)
MCV RBC AUTO: 88 FL (ref 82–98)
MONOCYTES # BLD AUTO: 1.58 THOUSAND/ÂΜL (ref 0.17–1.22)
MONOCYTES NFR BLD AUTO: 12 % (ref 4–12)
NEUTROPHILS # BLD AUTO: 10.11 THOUSANDS/ÂΜL (ref 1.85–7.62)
NEUTS SEG NFR BLD AUTO: 76 % (ref 43–75)
NITRITE UR QL STRIP: NEGATIVE
NON-SQ EPI CELLS URNS QL MICRO: NORMAL /HPF
NRBC BLD AUTO-RTO: 0 /100 WBCS
PH UR STRIP.AUTO: 6 [PH]
PLATELET # BLD AUTO: 220 THOUSANDS/UL (ref 149–390)
PMV BLD AUTO: 11.2 FL (ref 8.9–12.7)
POTASSIUM SERPL-SCNC: 3.5 MMOL/L (ref 3.5–5.3)
PROCALCITONIN SERPL-MCNC: 0.2 NG/ML
PROT SERPL-MCNC: 7.2 G/DL (ref 6.4–8.4)
PROT UR STRIP-MCNC: NEGATIVE MG/DL
PROTHROMBIN TIME: 14.9 SECONDS (ref 12.3–15)
RBC # BLD AUTO: 4.81 MILLION/UL (ref 3.88–5.62)
RBC #/AREA URNS AUTO: NORMAL /HPF
RSV RNA RESP QL NAA+PROBE: NEGATIVE
SARS-COV-2 RNA RESP QL NAA+PROBE: NEGATIVE
SODIUM SERPL-SCNC: 135 MMOL/L (ref 135–147)
SP GR UR STRIP.AUTO: 1.02 (ref 1–1.03)
UROBILINOGEN UR QL STRIP.AUTO: 0.2 E.U./DL
WBC # BLD AUTO: 13.49 THOUSAND/UL (ref 4.31–10.16)
WBC #/AREA URNS AUTO: NORMAL /HPF

## 2025-05-10 PROCEDURE — 71046 X-RAY EXAM CHEST 2 VIEWS: CPT

## 2025-05-10 PROCEDURE — 85025 COMPLETE CBC W/AUTO DIFF WBC: CPT | Performed by: EMERGENCY MEDICINE

## 2025-05-10 PROCEDURE — 99285 EMERGENCY DEPT VISIT HI MDM: CPT | Performed by: EMERGENCY MEDICINE

## 2025-05-10 PROCEDURE — 36415 COLL VENOUS BLD VENIPUNCTURE: CPT | Performed by: EMERGENCY MEDICINE

## 2025-05-10 PROCEDURE — 83605 ASSAY OF LACTIC ACID: CPT | Performed by: EMERGENCY MEDICINE

## 2025-05-10 PROCEDURE — 0241U HB NFCT DS VIR RESP RNA 4 TRGT: CPT | Performed by: EMERGENCY MEDICINE

## 2025-05-10 PROCEDURE — 85610 PROTHROMBIN TIME: CPT | Performed by: EMERGENCY MEDICINE

## 2025-05-10 PROCEDURE — 80053 COMPREHEN METABOLIC PANEL: CPT | Performed by: EMERGENCY MEDICINE

## 2025-05-10 PROCEDURE — 83690 ASSAY OF LIPASE: CPT | Performed by: EMERGENCY MEDICINE

## 2025-05-10 PROCEDURE — 81001 URINALYSIS AUTO W/SCOPE: CPT | Performed by: EMERGENCY MEDICINE

## 2025-05-10 PROCEDURE — 85730 THROMBOPLASTIN TIME PARTIAL: CPT | Performed by: EMERGENCY MEDICINE

## 2025-05-10 PROCEDURE — 84145 PROCALCITONIN (PCT): CPT | Performed by: EMERGENCY MEDICINE

## 2025-05-10 PROCEDURE — 81003 URINALYSIS AUTO W/O SCOPE: CPT | Performed by: EMERGENCY MEDICINE

## 2025-05-10 PROCEDURE — 93005 ELECTROCARDIOGRAM TRACING: CPT

## 2025-05-10 PROCEDURE — 71260 CT THORAX DX C+: CPT

## 2025-05-10 PROCEDURE — 74177 CT ABD & PELVIS W/CONTRAST: CPT

## 2025-05-10 PROCEDURE — 96360 HYDRATION IV INFUSION INIT: CPT

## 2025-05-10 PROCEDURE — 96361 HYDRATE IV INFUSION ADD-ON: CPT

## 2025-05-10 PROCEDURE — 99285 EMERGENCY DEPT VISIT HI MDM: CPT

## 2025-05-10 PROCEDURE — 87040 BLOOD CULTURE FOR BACTERIA: CPT | Performed by: EMERGENCY MEDICINE

## 2025-05-10 RX ORDER — ACETAMINOPHEN 325 MG/1
975 TABLET ORAL ONCE
Status: COMPLETED | OUTPATIENT
Start: 2025-05-10 | End: 2025-05-10

## 2025-05-10 RX ADMIN — ACETAMINOPHEN 975 MG: 325 TABLET, FILM COATED ORAL at 15:10

## 2025-05-10 RX ADMIN — IOHEXOL 99 ML: 350 INJECTION, SOLUTION INTRAVENOUS at 16:11

## 2025-05-10 RX ADMIN — SODIUM CHLORIDE 1000 ML: 0.9 INJECTION, SOLUTION INTRAVENOUS at 15:09

## 2025-05-10 NOTE — ED PROVIDER NOTES
Time reflects when diagnosis was documented in both MDM as applicable and the Disposition within this note       Time User Action Codes Description Comment    5/10/2025  5:34 PM Janet Cortez Add [R50.9] Fever     5/10/2025  5:35 PM Janet Cortez Add [R05.9] Cough     5/10/2025  5:35 PM Janet Cortez Add [R10.9] Abdominal pain     5/10/2025  5:38 PM Janet Cortez Add [R91.1] Pulmonary nodule           ED Disposition       ED Disposition   Discharge    Condition   Stable    Date/Time   Sat May 10, 2025  5:34 PM    Comment   Felix Grimm discharge to home/self care.                   Assessment & Plan       Medical Decision Making  64-year-old male presenting for evaluation of fevers.  Patient with symptoms including cough, headache, abdominal pain.  Differential diagnoses include but not limited to viral infection, UTI, intra-abdominal infection, perforation, pneumonia.  Labs overall unremarkable.  Mild leukocytosis noted.  No evidence of UTI.  CT chest abdomen pelvis completed with no acute source of infection or acute pathology.  Incidentally noted pulmonary nodule for which patient was recommended to follow-up with his primary care physician.  Fever improved after administration of Tylenol.  Patient is overall well-appearing.  He is otherwise stable for discharge at this time.  Advised follow-up with PCP.  Return precautions discussed.    Problems Addressed:  Abdominal pain: acute illness or injury  Cough: acute illness or injury  Fever: acute illness or injury  Pulmonary nodule: acute illness or injury    Amount and/or Complexity of Data Reviewed  Labs: ordered. Decision-making details documented in ED Course.  Radiology: ordered and independent interpretation performed.  ECG/medicine tests: ordered and independent interpretation performed. Decision-making details documented in ED Course.    Risk  OTC drugs.  Prescription drug management.        ED Course as of 05/10/25 1745   Sat May 10, 2025   9236  "Procedure Note: EKG  Date/Time: 05/10/25 3:03 PM   Interpreted by: Janet Cortez  Indications / Diagnosis: fever  ECG reviewed by me, the ED Provider: yes   The EKG demonstrates:  Rhythm: rate 80, normal sinus  Intervals: normal intervals  Axis: normal axis  QRS/Blocks: normal QRS  ST Changes: No acute ST Changes, no STD/KELLY.    1521 UA w Reflex to Microscopic w Reflex to Culture(!)   1526 LACTIC ACID: 0.8   1526 LIPASE: 35   1526 Comprehensive metabolic panel   1535 Procalcitonin: 0.20   1538 Urine Microscopic   1545 FLU/RSV/COVID - if FLU/RSV clinically relevant (2hr TAT)       Medications   sodium chloride 0.9 % bolus 1,000 mL (1,000 mL Intravenous New Bag 5/10/25 1509)   acetaminophen (TYLENOL) tablet 975 mg (975 mg Oral Given 5/10/25 1510)   iohexol (OMNIPAQUE) 350 MG/ML injection (SINGLE-DOSE) 100 mL (99 mL Intravenous Given 5/10/25 1611)       ED Risk Strat Scores                    No data recorded        SBIRT 20yo+      Flowsheet Row Most Recent Value   Initial Alcohol Screen: US AUDIT-C     1. How often do you have a drink containing alcohol? 0 Filed at: 05/10/2025 1448   2. How many drinks containing alcohol do you have on a typical day you are drinking?  0 Filed at: 05/10/2025 1448   3a. Male UNDER 65: How often do you have five or more drinks on one occasion? 0 Filed at: 05/10/2025 1448   Audit-C Score 0 Filed at: 05/10/2025 1448   ANGELICA: How many times in the past year have you...    Used an illegal drug or used a prescription medication for non-medical reasons? Never Filed at: 05/10/2025 1448                            History of Present Illness       Chief Complaint   Patient presents with    Fever     Patient c/o intermittent fevers, chills, abdominal pain, and headache since Wednesday. Attempted Tylenol one time and was concerned it was giving him the \"shakes\". No otc meds pta.        Past Medical History:   Diagnosis Date    Hypertension     Nasal congestion       Past Surgical History: "   Procedure Laterality Date    APPENDECTOMY      COLONOSCOPY      IN BX PROSTATE STRTCTC SATURATION SAMPLING IMG GID N/A 9/13/2022    Procedure: TRANSPERINEAL MRI FUSION  BIOPSY PROSTATE;  Surgeon: Artem Conner MD;  Location: [facility] MAIN OR;  Service: Urology      Family History   Problem Relation Age of Onset    Heart disease Mother     Heart disease Father     Hypertension Father     Diabetes Brother     No Known Problems Brother     No Known Problems Brother     No Known Problems Brother     No Known Problems Brother     No Known Problems Maternal Grandmother     No Known Problems Maternal Grandfather     No Known Problems Paternal Grandmother     No Known Problems Paternal Grandfather       Social History     Tobacco Use    Smoking status: Never    Smokeless tobacco: Never   Vaping Use    Vaping status: Never Used   Substance Use Topics    Alcohol use: Yes     Alcohol/week: 2.0 standard drinks of alcohol     Types: 2 Glasses of wine per week     Comment: occ    Drug use: Never      E-Cigarette/Vaping    E-Cigarette Use Never User       E-Cigarette/Vaping Substances    Nicotine No     THC No     CBD No     Flavoring No     Other No     Unknown No       I have reviewed and agree with the history as documented.     64-year-old male with history of hypertension presenting for evaluation of fevers.  Patient underwent a colonoscopy 3 days ago.  Later that day, he developed fever.  He has had persistent fever since that time as high as 102 Fahrenheit at home.  He has had a cough, headache, and left-sided abdominal pain.  He developed some nausea on the way here.  No vomiting or diarrhea.  He has had urinary frequency but states that he has been drinking a lot of water.  No dysuria or hematuria.  No congestion, sore throat.  He tried taking Tylenol once.        Review of Systems   Constitutional:  Positive for chills and fever.   HENT:  Negative for congestion and sore throat.    Respiratory:  Positive for cough.  Negative for shortness of breath.    Cardiovascular:  Negative for chest pain.   Gastrointestinal:  Positive for abdominal pain and nausea. Negative for diarrhea and vomiting.   Genitourinary:  Positive for frequency. Negative for dysuria and flank pain.   Musculoskeletal:  Negative for gait problem.   Skin:  Negative for rash.   Neurological:  Positive for headaches. Negative for weakness and light-headedness.   All other systems reviewed and are negative.          Objective       ED Triage Vitals   Temperature Pulse Blood Pressure Respirations SpO2 Patient Position - Orthostatic VS   05/10/25 1445 05/10/25 1445 05/10/25 1445 05/10/25 1445 05/10/25 1445 05/10/25 1445   (!) 103.1 °F (39.5 °C) 97 153/83 18 95 % Lying      Temp Source Heart Rate Source BP Location FiO2 (%) Pain Score    05/10/25 1445 05/10/25 1445 05/10/25 1445 -- 05/10/25 1510    Oral Monitor Right arm  Med Not Given for Pain - for MAR use only      Vitals      Date and Time Temp Pulse SpO2 Resp BP Pain Score FACES Pain Rating User   05/10/25 1716 98.7 °F (37.1 °C) 61 96 % 16 123/55 -- -- AW   05/10/25 1615 -- 66 95 % 15 126/63 -- -- JACOB   05/10/25 1606 99.7 °F (37.6 °C) 65 99 % 18 124/78 -- -- JACOB   05/10/25 1510 -- -- -- -- -- Med Not Given for Pain - for MAR use only -- JACOB   05/10/25 1445 103.1 °F (39.5 °C) 97 95 % 18 153/83 -- -- DG            Physical Exam  Vitals and nursing note reviewed.   Constitutional:       General: He is not in acute distress.     Appearance: He is not ill-appearing.   HENT:      Head: Normocephalic and atraumatic.      Nose: Nose normal.      Mouth/Throat:      Mouth: Mucous membranes are moist.   Eyes:      Conjunctiva/sclera: Conjunctivae normal.   Cardiovascular:      Rate and Rhythm: Normal rate and regular rhythm.      Heart sounds: No murmur heard.     No friction rub. No gallop.   Pulmonary:      Effort: Pulmonary effort is normal.      Breath sounds: Normal breath sounds. No wheezing, rhonchi or rales.    Abdominal:      General: There is no distension.      Palpations: Abdomen is soft.      Tenderness: There is abdominal tenderness in the left upper quadrant and left lower quadrant. There is no guarding or rebound.   Musculoskeletal:         General: No swelling or tenderness. Normal range of motion.      Cervical back: Normal range of motion and neck supple. No rigidity.   Skin:     General: Skin is warm and dry.      Coloration: Skin is not pale.      Findings: No rash.   Neurological:      General: No focal deficit present.      Mental Status: He is alert and oriented to person, place, and time.   Psychiatric:         Behavior: Behavior normal.         Results Reviewed       Procedure Component Value Units Date/Time    FLU/RSV/COVID - if FLU/RSV clinically relevant (2hr TAT) [[id number]]  (Normal) Collected: 05/10/25 1501    Lab Status: Final result Specimen: Nares from Nose Updated: 05/10/25 3894     SARS-CoV-2 Negative     INFLUENZA A PCR Negative     INFLUENZA B PCR Negative     RSV PCR Negative    Narrative:      This test has been performed using the CoV-2/Flu/RSV plus assay on the [employer] GeneXpert platform. This test has been validated by the  and verified by the performing laboratory.     This test is designed to amplify and detect the following: nucleocapsid (N), envelope (E), and RNA-dependent RNA polymerase (RdRP) genes of the SARS-CoV-2 genome; matrix (M), basic polymerase (PB2), and acidic protein (PA) segments of the influenza A genome; matrix (M) and non-structural protein (NS) segments of the influenza B genome, and the nucleocapsid genes of RSV A and RSV B.     Positive results are indicative of the presence of Flu A, Flu B, RSV, and/or SARS-CoV-2 RNA. Positive results for SARS-CoV-2 or suspected novel influenza should be reported to state, local, or federal health departments according to local reporting requirements.      All results should be assessed in conjunction with clinical  presentation and other laboratory markers for clinical management.     FOR PEDIATRIC PATIENTS - copy/paste COVID Guidelines URL to browser: [email or web address]       Urine Microscopic [[id number]]  (Normal) Collected: 05/10/25 1513    Lab Status: Final result Specimen: Urine, Clean Catch Updated: 05/10/25 1536     RBC, UA None Seen /hpf      WBC, UA None Seen /hpf      Epithelial Cells None Seen /hpf      Bacteria, UA None Seen /hpf     Procalcitonin [[id number]]  (Normal) Collected: 05/10/25 1501    Lab Status: Final result Specimen: Blood from Arm, Left Updated: 05/10/25 1534     Procalcitonin 0.20 ng/ml     Protime-INR [[id number]]  (Normal) Collected: 05/10/25 1501    Lab Status: Final result Specimen: Blood from Arm, Left Updated: 05/10/25 1530     Protime 14.9 seconds      INR 1.13    Narrative:      INR Therapeutic Range    Indication                                             INR Range      Atrial Fibrillation                                               2.0-3.0  Hypercoagulable State                                    2.0.2.3  Left Ventricular Asist Device                            2.0-3.0  Mechanical Heart Valve                                  -    Aortic(with afib, MI, embolism, HF, LA enlargement,    and/or coagulopathy)                                     2.0-3.0 (2.5-3.5)     Mitral                                                             2.5-3.5  Prosthetic/Bioprosthetic Heart Valve               2.0-3.0  Venous thromboembolism (VTE: VT, PE        2.0-3.0    APTT [[id number]]  (Normal) Collected: 05/10/25 1501    Lab Status: Final result Specimen: Blood from Arm, Left Updated: 05/10/25 1530     PTT 29 seconds     Lactic acid, plasma (w/reflex if result > 2.0) [[id number]]  (Normal) Collected: 05/10/25 1501    Lab Status: Final result Specimen: Blood from Arm, Left Updated: 05/10/25 1526     LACTIC ACID 0.8 mmol/L     Narrative:      Result may  be elevated if tourniquet was used during collection.    Comprehensive metabolic panel [[id number]] Collected: 05/10/25 1501    Lab Status: Final result Specimen: Blood from Arm, Left Updated: 05/10/25 1526     Sodium 135 mmol/L      Potassium 3.5 mmol/L      Chloride 100 mmol/L      CO2 25 mmol/L      ANION GAP 10 mmol/L      BUN 19 mg/dL      Creatinine 1.15 mg/dL      Glucose 117 mg/dL      Calcium 9.2 mg/dL      AST 13 U/L      ALT 12 U/L      Alkaline Phosphatase 70 U/L      Total Protein 7.2 g/dL      Albumin 4.0 g/dL      Total Bilirubin 0.65 mg/dL      eGFR 66 ml/min/1.73sq m     Narrative:      National Kidney Disease Foundation guidelines for Chronic Kidney Disease (CKD):     Stage 1 with normal or high GFR (GFR > 90 mL/min/1.73 square meters)    Stage 2 Mild CKD (GFR = 60-89 mL/min/1.73 square meters)    Stage 3A Moderate CKD (GFR = 45-59 mL/min/1.73 square meters)    Stage 3B Moderate CKD (GFR = 30-44 mL/min/1.73 square meters)    Stage 4 Severe CKD (GFR = 15-29 mL/min/1.73 square meters)    Stage 5 End Stage CKD (GFR <15 mL/min/1.73 square meters)  Note: GFR calculation is accurate only with a steady state creatinine    Lipase [[id number]]  (Normal) Collected: 05/10/25 1501    Lab Status: Final result Specimen: Blood from Arm, Left Updated: 05/10/25 1526     Lipase 35 u/L     UA w Reflex to Microscopic w Reflex to Culture [[id number]]  (Abnormal) Collected: 05/10/25 1513    Lab Status: Final result Specimen: Urine, Clean Catch Updated: 05/10/25 1518     Color, UA Yellow     Clarity, UA Clear     Specific Gravity, UA 1.025     pH, UA 6.0     Leukocytes, UA Negative     Nitrite, UA Negative     Protein, UA Negative mg/dl      Glucose, UA Negative mg/dl      Ketones, UA Negative mg/dl      Urobilinogen, UA 0.2 E.U./dl      Bilirubin, UA Negative     Occult Blood, UA 2+    CBC and differential [[id number]]  (Abnormal) Collected: 05/10/25 1501    Lab Status: Final result Specimen: Blood from Arm, Left Updated:  05/10/25 1508     WBC 13.49 Thousand/uL      RBC 4.81 Million/uL      Hemoglobin 14.2 g/dL      Hematocrit 42.4 %      MCV 88 fL      MCH 29.5 pg      MCHC 33.5 g/dL      RDW 13.3 %      MPV 11.2 fL      Platelets 220 Thousands/uL      nRBC 0 /100 WBCs      Segmented % 76 %      Immature Grans % 0 %      Lymphocytes % 12 %      Monocytes % 12 %      Eosinophils Relative 0 %      Basophils Relative 0 %      Absolute Neutrophils 10.11 Thousands/µL      Absolute Immature Grans 0.05 Thousand/uL      Absolute Lymphocytes 1.65 Thousands/µL      Absolute Monocytes 1.58 Thousand/µL      Eosinophils Absolute 0.05 Thousand/µL      Basophils Absolute 0.05 Thousands/µL     Blood culture #1 [[id number]] Collected: 05/10/25 1501    Lab Status: In process Specimen: Blood from Arm, Left Updated: 05/10/25 1506    Blood culture #2 [[id number]] Collected: 05/10/25 1501    Lab Status: In process Specimen: Blood from Arm, Right Updated: 05/10/25 1506            CT chest abdomen pelvis w contrast   Final Interpretation by Vitaliy Alexander MD (05/10 1718)      1.  No identifiable acute abnormality to account for the patient's clinical presentation.   2.  There is a 2 mm right upper lobe pulmonary nodule. Based on current Fleischner Society 2017 Guidelines on incidental pulmonary nodule, no routine follow-up is needed if the patient is low risk. If the patient is high risk, optional follow-up chest CT    at 12 months can be considered.   3.  Prominent nonspecific prostatic enlargement.               Workstation performed: [id number]         XR chest 2 views   ED Interpretation by Janet Cortez MD (05/10 1728)   No infiltrate or pneumothorax. Independently interpreted by me.       by Shruthi Zuniga MD (05/10 4798)          Procedures    ED Medication and Procedure Management   Prior to Admission Medications   Prescriptions Last Dose Informant Patient Reported? Taking?   losartan-hydrochlorothiazide (HYZAAR) 50-12.5 mg per tablet    No No   Sig: TAKE 1 TABLET BY MOUTH EVERY DAY   tamsulosin (FLOMAX) 0.4 mg  Self No No   Sig: TAKE 1 CAPSULE BY MOUTH EVERY DAY WITH DINNER      Facility-Administered Medications: None     Patient's Medications   Discharge Prescriptions    No medications on file     No discharge procedures on file.  ED SEPSIS DOCUMENTATION   Time reflects when diagnosis was documented in both MDM as applicable and the Disposition within this note       Time User Action Codes Description Comment    5/10/2025  5:34 PM Janet Cortez [R50.9] Fever     5/10/2025  5:35 PM Janet Cortez [R05.9] Cough     5/10/2025  5:35 PM Janet Cortez [R10.9] Abdominal pain     5/10/2025  5:38 PM Janet Cortez [R91.1] Pulmonary nodule                  Janet Cortez MD  05/10/25 8417

## 2025-05-10 NOTE — INCIDENTAL FINDINGS
The following findings require follow up:  Radiographic finding   Findin mm right upper lobe pulmonary nodule. Based on current Fleischner Society 2017 Guidelines on incidental pulmonary nodule, no routine follow-up is needed if the patient is low risk. If the patient is high risk, optional follow-up chest CT   at 12 months can be considered.   Follow up required: repeat CT   Follow up should be done within 1 month(s)    Please notify the following clinician to assist with the follow up:   PCP    Incidental finding results were discussed with the Patient by Janet Cortez MD on 05/10/25.   They expressed understanding and all questions answered.

## 2025-05-10 NOTE — DISCHARGE INSTRUCTIONS
Follow-up with your primary care physician.  You can continue taking Tylenol and Motrin every 6 hours as needed for fevers.  Please return to the emergency department if you develop worsening symptoms, severe pain, uncontrolled vomiting, difficulty breathing, or anything else concerning to you.

## 2025-05-12 ENCOUNTER — OFFICE VISIT (OUTPATIENT)
Dept: FAMILY MEDICINE CLINIC | Facility: CLINIC | Age: 65
End: 2025-05-12
Payer: COMMERCIAL

## 2025-05-12 VITALS
BODY MASS INDEX: 28.19 KG/M2 | TEMPERATURE: 100.6 F | WEIGHT: 186 LBS | RESPIRATION RATE: 16 BRPM | HEART RATE: 83 BPM | OXYGEN SATURATION: 98 % | DIASTOLIC BLOOD PRESSURE: 78 MMHG | HEIGHT: 68 IN | SYSTOLIC BLOOD PRESSURE: 120 MMHG

## 2025-05-12 DIAGNOSIS — R50.9 FEVER IN ADULT: ICD-10-CM

## 2025-05-12 DIAGNOSIS — R91.1 PULMONARY NODULE: ICD-10-CM

## 2025-05-12 DIAGNOSIS — I10 PRIMARY HYPERTENSION: ICD-10-CM

## 2025-05-12 DIAGNOSIS — G44.89 OTHER HEADACHE SYNDROME: ICD-10-CM

## 2025-05-12 DIAGNOSIS — N40.0 BENIGN PROSTATIC HYPERPLASIA WITHOUT LOWER URINARY TRACT SYMPTOMS: Primary | ICD-10-CM

## 2025-05-12 DIAGNOSIS — M25.50 PAIN IN JOINT, MULTIPLE SITES: ICD-10-CM

## 2025-05-12 PROCEDURE — 88305 TISSUE EXAM BY PATHOLOGIST: CPT | Performed by: PATHOLOGY

## 2025-05-12 PROCEDURE — 99214 OFFICE O/P EST MOD 30 MIN: CPT | Performed by: FAMILY MEDICINE

## 2025-05-12 NOTE — ASSESSMENT & PLAN NOTE
No focal origen by history only complaint is arthralgia on off  with fever  pt did have mild Left shift elevated WBC check lyme disease  no rashes on body headache and arthralgias only related to fever  may be viral but given elevated fever and wbc will cover with antibiotic   augmentin  875 mg po bid  with food  loose stools may happen  cont hydration if worsens or new symptoms develop to ER    Orders:    CBC and differential; Future    Lyme Total AB W Reflex to IGM/IGG; Future    amoxicillin-clavulanate (AUGMENTIN) 875-125 mg per tablet; Take 1 tablet by mouth every 12 (twelve) hours for 10 days

## 2025-05-12 NOTE — PROGRESS NOTES
Name: Felix Grimm      : 1960      MRN: [id number]  Encounter Provider: Renee Pugh MD  Encounter Date: 2025   Encounter department: [facility]  :  Assessment & Plan  Benign prostatic hyperplasia without lower urinary tract symptoms  Asymptomatic seen on CT pelvis          Primary hypertension  Well controlled on current therapy continue with current medications and will reassess next visit           Pulmonary nodule  2 mm RUL never smoker guidelines do not indicate need for follow up          Fever in adult  No focal origen by history only complaint is arthralgia on off  with fever  pt did have mild Left shift elevated WBC check lyme disease  no rashes on body headache and arthralgias only related to fever  may be viral but given elevated fever and wbc will cover with antibiotic   augmentin  875 mg po bid  with food  loose stools may happen  cont hydration if worsens or new symptoms develop to ER    Orders:    CBC and differential; Future    Lyme Total AB W Reflex to IGM/IGG; Future    amoxicillin-clavulanate (AUGMENTIN) 875-125 mg per tablet; Take 1 tablet by mouth every 12 (twelve) hours for 10 days    Pain in joint, multiple sites  Occurs with fever     Orders:    amoxicillin-clavulanate (AUGMENTIN) 875-125 mg per tablet; Take 1 tablet by mouth every 12 (twelve) hours for 10 days    Other headache syndrome  Occurs with fever                 History of Present Illness   Pt with fever for 5 days  last pm 104.1 after colonoscopy  has joint pains loss of appetite no Nausea no vomiting normal bowel  movement   no dysuria  had mild initial cough  now better  pt had similar  problem 5 yrs ago  after prostate biopsy  pt had antibiotic  for 1 month pt states he did not have endocarditis or heart problem pt does not spend time outdoors does not report tick bite has no pets     Fever  Associated symptoms include arthralgias, a fever and headaches (with  fever no headache  "now). Pertinent negatives include no abdominal pain, chest pain, chills, congestion, coughing, myalgias, nausea, sore throat or vomiting.     Review of Systems   Constitutional:  Positive for appetite change (decreased appetite) and fever. Negative for chills.   HENT:  Negative for congestion, ear pain, rhinorrhea, sinus pressure, sinus pain and sore throat.    Respiratory:  Negative for cough and shortness of breath.    Cardiovascular:  Negative for chest pain.   Gastrointestinal:  Negative for abdominal pain, constipation, diarrhea, nausea and vomiting.   Genitourinary:  Negative for dysuria, frequency, penile discharge, penile swelling, testicular pain and urgency.        No perineal pain    Musculoskeletal:  Positive for arthralgias. Negative for myalgias.   Neurological:  Positive for headaches (with  fever no headache now).       Objective   /78 (BP Location: Left arm, Patient Position: Sitting, Cuff Size: Standard)   Pulse 83   Temp (!) 100.6 °F (38.1 °C) (Tympanic)   Resp 16   Ht 5' 8\" (1.727 m)   Wt 84.4 kg (186 lb)   SpO2 98%   BMI 28.28 kg/m²      Physical Exam  Vitals and nursing note reviewed.   Constitutional:       General: He is not in acute distress.     Appearance: Normal appearance. He is well-developed. He is obese.   HENT:      Right Ear: Tympanic membrane and ear canal normal.      Left Ear: Tympanic membrane and ear canal normal.      Mouth/Throat:      Mouth: Mucous membranes are moist.   Eyes:      Extraocular Movements: Extraocular movements intact.      Conjunctiva/sclera: Conjunctivae normal.      Pupils: Pupils are equal, round, and reactive to light.   Neck:      Thyroid: No thyromegaly.      Vascular: No carotid bruit.   Cardiovascular:      Rate and Rhythm: Normal rate and regular rhythm.      Pulses: Normal pulses.      Heart sounds: Normal heart sounds. No murmur heard.  Pulmonary:      Effort: Pulmonary effort is normal. No respiratory distress.      Breath sounds: " Normal breath sounds. No wheezing or rales.   Abdominal:      General: Bowel sounds are normal. There is no distension.      Palpations: Abdomen is soft. There is no mass.      Tenderness: There is no abdominal tenderness. There is no right CVA tenderness, left CVA tenderness, guarding or rebound.      Hernia: No hernia is present.   Musculoskeletal:      Cervical back: Normal range of motion and neck supple.      Right lower leg: No edema.      Left lower leg: No edema.   Lymphadenopathy:      Cervical: No cervical adenopathy.   Skin:     General: Skin is warm and dry.      Capillary Refill: Capillary refill takes less than 2 seconds.      Findings: No rash.      Comments: No abnormal moles   Neurological:      General: No focal deficit present.      Mental Status: He is alert and oriented to person, place, and time.      Cranial Nerves: No cranial nerve deficit.      Sensory: No sensory deficit.      Motor: No weakness.      Coordination: Coordination normal.      Gait: Gait normal.      Deep Tendon Reflexes: Reflexes normal.   Psychiatric:         Mood and Affect: Mood normal.         Behavior: Behavior normal.         Thought Content: Thought content normal.

## 2025-05-12 NOTE — ASSESSMENT & PLAN NOTE
Occurs with fever     Orders:    amoxicillin-clavulanate (AUGMENTIN) 875-125 mg per tablet; Take 1 tablet by mouth every 12 (twelve) hours for 10 days

## 2025-05-15 LAB
ATRIAL RATE: 80 BPM
P AXIS: 72 DEGREES
PR INTERVAL: 136 MS
QRS AXIS: 54 DEGREES
QRSD INTERVAL: 88 MS
QT INTERVAL: 350 MS
QTC INTERVAL: 403 MS
T WAVE AXIS: 50 DEGREES
VENTRICULAR RATE: 80 BPM

## 2025-05-15 PROCEDURE — 93010 ELECTROCARDIOGRAM REPORT: CPT | Performed by: INTERNAL MEDICINE

## 2025-05-16 LAB
BACTERIA BLD CULT: NORMAL
BACTERIA BLD CULT: NORMAL

## 2025-05-23 ENCOUNTER — APPOINTMENT (OUTPATIENT)
Dept: LAB | Facility: HOSPITAL | Age: 65
End: 2025-05-23
Attending: FAMILY MEDICINE
Payer: COMMERCIAL

## 2025-05-23 DIAGNOSIS — R50.9 FEVER IN ADULT: ICD-10-CM

## 2025-05-23 LAB
B BURGDOR IGG+IGM SER QL IA: NEGATIVE
BASOPHILS # BLD AUTO: 0.05 THOUSANDS/ÂΜL (ref 0–0.1)
BASOPHILS NFR BLD AUTO: 1 % (ref 0–1)
EOSINOPHIL # BLD AUTO: 0.18 THOUSAND/ÂΜL (ref 0–0.61)
EOSINOPHIL NFR BLD AUTO: 2 % (ref 0–6)
ERYTHROCYTE [DISTWIDTH] IN BLOOD BY AUTOMATED COUNT: 13.7 % (ref 11.6–15.1)
HCT VFR BLD AUTO: 45.8 % (ref 36.5–49.3)
HGB BLD-MCNC: 14.3 G/DL (ref 12–17)
IMM GRANULOCYTES # BLD AUTO: 0.06 THOUSAND/UL (ref 0–0.2)
IMM GRANULOCYTES NFR BLD AUTO: 1 % (ref 0–2)
LYMPHOCYTES # BLD AUTO: 2.46 THOUSANDS/ÂΜL (ref 0.6–4.47)
LYMPHOCYTES NFR BLD AUTO: 33 % (ref 14–44)
MCH RBC QN AUTO: 28.7 PG (ref 26.8–34.3)
MCHC RBC AUTO-ENTMCNC: 31.2 G/DL (ref 31.4–37.4)
MCV RBC AUTO: 92 FL (ref 82–98)
MONOCYTES # BLD AUTO: 0.47 THOUSAND/ÂΜL (ref 0.17–1.22)
MONOCYTES NFR BLD AUTO: 6 % (ref 4–12)
NEUTROPHILS # BLD AUTO: 4.25 THOUSANDS/ÂΜL (ref 1.85–7.62)
NEUTS SEG NFR BLD AUTO: 57 % (ref 43–75)
NRBC BLD AUTO-RTO: 0 /100 WBCS
PLATELET # BLD AUTO: 565 THOUSANDS/UL (ref 149–390)
PMV BLD AUTO: 10.1 FL (ref 8.9–12.7)
RBC # BLD AUTO: 4.99 MILLION/UL (ref 3.88–5.62)
WBC # BLD AUTO: 7.47 THOUSAND/UL (ref 4.31–10.16)

## 2025-05-23 PROCEDURE — 86618 LYME DISEASE ANTIBODY: CPT

## 2025-05-23 PROCEDURE — 85025 COMPLETE CBC W/AUTO DIFF WBC: CPT

## 2025-05-23 PROCEDURE — 36415 COLL VENOUS BLD VENIPUNCTURE: CPT

## 2025-06-11 PROBLEM — R50.9 FEVER IN ADULT: Status: RESOLVED | Noted: 2025-05-12 | Resolved: 2025-06-11

## 2025-08-14 ENCOUNTER — APPOINTMENT (OUTPATIENT)
Dept: LAB | Facility: HOSPITAL | Age: 65
End: 2025-08-14
Payer: COMMERCIAL

## 2025-08-15 ENCOUNTER — RESULTS FOLLOW-UP (OUTPATIENT)
Dept: OTHER | Facility: HOSPITAL | Age: 65
End: 2025-08-15

## 2025-08-20 PROBLEM — D75.839 THROMBOCYTOSIS: Status: ACTIVE | Noted: 2025-08-20

## (undated) DEVICE — CHLORAPREP HI-LITE 26ML ORANGE

## (undated) DEVICE — SYRINGE 10ML LL

## (undated) DEVICE — 3M™ IOBAN™ 2 ANTIMICROBIAL INCISE DRAPE 6640EZ: Brand: IOBAN™ 2

## (undated) DEVICE — ULTRASOUND GEL STERILE FOIL PK

## (undated) DEVICE — UTILITY MARKER,BLACK WITH LABELS: Brand: DEVON

## (undated) DEVICE — SYSTEM TRANSPERINEAL ACCESS PRECISIONPOINT

## (undated) DEVICE — MAX-CORE® DISPOSABLE CORE BIOPSY INSTRUMENT, 18G X 25CM: Brand: MAX-CORE

## (undated) DEVICE — GLOVE SRG BIOGEL ECLIPSE 7.5

## (undated) DEVICE — HEAVY DUTY TABLE COVER: Brand: CONVERTORS

## (undated) DEVICE — TELFA NON-ADHERENT ABSORBENT DRESSING: Brand: TELFA

## (undated) DEVICE — PREP PAD BNS: Brand: CONVERTORS

## (undated) DEVICE — SPECIMEN CONTAINER STERILE PEEL PACK

## (undated) DEVICE — NEEDLE 25G X 1 1/2